# Patient Record
Sex: FEMALE | Race: BLACK OR AFRICAN AMERICAN | NOT HISPANIC OR LATINO | Employment: FULL TIME | URBAN - METROPOLITAN AREA
[De-identification: names, ages, dates, MRNs, and addresses within clinical notes are randomized per-mention and may not be internally consistent; named-entity substitution may affect disease eponyms.]

---

## 2019-12-17 ENCOUNTER — CLINICAL SUPPORT (OUTPATIENT)
Dept: URGENT CARE | Facility: CLINIC | Age: 24
End: 2019-12-17

## 2019-12-17 PROCEDURE — 86580 PR  TB INTRADERMAL TEST: ICD-10-PCS | Mod: S$GLB,,, | Performed by: EMERGENCY MEDICINE

## 2019-12-17 PROCEDURE — 86580 TB INTRADERMAL TEST: CPT | Mod: S$GLB,,, | Performed by: EMERGENCY MEDICINE

## 2020-01-08 ENCOUNTER — HOSPITAL ENCOUNTER (EMERGENCY)
Facility: HOSPITAL | Age: 25
Discharge: HOME | End: 2020-01-08
Attending: EMERGENCY MEDICINE
Payer: MEDICAID

## 2020-01-08 VITALS
SYSTOLIC BLOOD PRESSURE: 155 MMHG | RESPIRATION RATE: 15 BRPM | HEART RATE: 78 BPM | DIASTOLIC BLOOD PRESSURE: 78 MMHG | HEIGHT: 67 IN | BODY MASS INDEX: 29.66 KG/M2 | WEIGHT: 189 LBS | OXYGEN SATURATION: 100 % | TEMPERATURE: 98 F

## 2020-01-08 DIAGNOSIS — K52.9 GASTROENTERITIS: Primary | ICD-10-CM

## 2020-01-08 LAB
B-HCG UR QL: NEGATIVE
BILIRUB UR QL STRIP: NEGATIVE
CLARITY UR: CLEAR
COLOR UR: YELLOW
CTP QC/QA: YES
GLUCOSE UR QL STRIP: NEGATIVE
HGB UR QL STRIP: NEGATIVE
INFLUENZA A, MOLECULAR: NEGATIVE
INFLUENZA B, MOLECULAR: NEGATIVE
KETONES UR QL STRIP: NEGATIVE
LEUKOCYTE ESTERASE UR QL STRIP: NEGATIVE
NITRITE UR QL STRIP: NEGATIVE
PH UR STRIP: 8 [PH] (ref 5–8)
PROT UR QL STRIP: NEGATIVE
SP GR UR STRIP: 1.01 (ref 1–1.03)
SPECIMEN SOURCE: NORMAL
URN SPEC COLLECT METH UR: NORMAL
UROBILINOGEN UR STRIP-ACNC: NEGATIVE EU/DL

## 2020-01-08 PROCEDURE — 87502 INFLUENZA DNA AMP PROBE: CPT

## 2020-01-08 PROCEDURE — 81025 URINE PREGNANCY TEST: CPT | Performed by: EMERGENCY MEDICINE

## 2020-01-08 PROCEDURE — 81003 URINALYSIS AUTO W/O SCOPE: CPT

## 2020-01-08 PROCEDURE — 99282 EMERGENCY DEPT VISIT SF MDM: CPT

## 2020-01-08 NOTE — ED PROVIDER NOTES
Encounter Date: 1/8/2020       History     Chief Complaint   Patient presents with    Abdominal Pain     for 2 days    Diarrhea    Vomiting    Nausea     Patient here with reported abdominal pain onset approximately 2 days ago with associated nausea vomiting on the 1st day and diarrhea yesterday states that her symptoms have largely improved at this time she was concerned that she may still be running fever did not want to go back to work before she knew she would be well she denies any continue nausea and no diarrhea today she does not recall eating anything unusual and denies any sick contacts        Review of patient's allergies indicates:  No Known Allergies  Past Medical History:   Diagnosis Date    ADHD (attention deficit hyperactivity disorder)     Thyroid disease      History reviewed. No pertinent surgical history.  History reviewed. No pertinent family history.  Social History     Tobacco Use    Smoking status: Current Every Day Smoker     Packs/day: 1.00     Years: 1.00     Pack years: 1.00     Types: Cigarettes    Smokeless tobacco: Never Used   Substance Use Topics    Alcohol use: Yes    Drug use: No     Review of Systems   Constitutional: Negative for chills, fatigue and fever.   HENT: Negative.    Eyes: Negative.    Respiratory: Negative.    Cardiovascular: Negative.    Gastrointestinal: Positive for abdominal pain, diarrhea, nausea and vomiting.   Endocrine: Negative.    Genitourinary: Negative.    Musculoskeletal: Negative.    Skin: Negative.    Allergic/Immunologic: Negative.    Neurological: Negative.    Hematological: Negative.    Psychiatric/Behavioral: Negative.        Physical Exam     Initial Vitals [01/08/20 0808]   BP Pulse Resp Temp SpO2   (!) 160/91 90 18 97.5 °F (36.4 °C) 98 %      MAP       --         Physical Exam    Constitutional: She appears well-developed and well-nourished.   HENT:   Head: Normocephalic and atraumatic.   Right Ear: External ear normal.   Left Ear:  External ear normal.   Mouth/Throat: Oropharynx is clear and moist.   Eyes: Conjunctivae and EOM are normal. Pupils are equal, round, and reactive to light.   Neck: Normal range of motion. Neck supple.   Cardiovascular: Normal rate, regular rhythm, normal heart sounds and intact distal pulses.   Pulmonary/Chest: Breath sounds normal. She has no wheezes.   Abdominal: Soft. Bowel sounds are normal. There is no tenderness.   Musculoskeletal: Normal range of motion. She exhibits no edema or tenderness.   Lymphadenopathy:     She has no cervical adenopathy.   Neurological: She is alert and oriented to person, place, and time. She has normal strength. GCS score is 15. GCS eye subscore is 4. GCS verbal subscore is 5. GCS motor subscore is 6.   Skin: Skin is warm and dry. Capillary refill takes less than 2 seconds.   Psychiatric: She has a normal mood and affect. Her behavior is normal.         ED Course   Procedures  Labs Reviewed   URINALYSIS, REFLEX TO URINE CULTURE   INFLUENZA A AND B ANTIGEN   POCT URINE PREGNANCY          Imaging Results    None          Medical Decision Making:   ED Management:  Patient with history of nausea vomiting diarrhea associated abdominal pain influenza negative urinalysis negative patient is tolerating p.o. without difficulty at this time will discharge with outpatient follow-up                                 Clinical Impression:       ICD-10-CM ICD-9-CM   1. Gastroenteritis K52.9 558.9                             Xander Seo MD  01/08/20 1043

## 2020-06-07 ENCOUNTER — HOSPITAL ENCOUNTER (EMERGENCY)
Facility: HOSPITAL | Age: 25
Discharge: HOME | End: 2020-06-07
Attending: EMERGENCY MEDICINE
Payer: MEDICAID

## 2020-06-07 VITALS
SYSTOLIC BLOOD PRESSURE: 148 MMHG | BODY MASS INDEX: 28.56 KG/M2 | OXYGEN SATURATION: 98 % | RESPIRATION RATE: 18 BRPM | HEART RATE: 112 BPM | HEIGHT: 67 IN | TEMPERATURE: 99 F | WEIGHT: 182 LBS | DIASTOLIC BLOOD PRESSURE: 69 MMHG

## 2020-06-07 DIAGNOSIS — M25.562 ACUTE PAIN OF LEFT KNEE: Primary | ICD-10-CM
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
## 2020-06-07 LAB
B-HCG UR QL: NEGATIVE
CTP QC/QA: YES

## 2020-06-07 PROCEDURE — 99283 EMERGENCY DEPT VISIT LOW MDM: CPT | Mod: 25

## 2020-06-07 PROCEDURE — 81025 URINE PREGNANCY TEST: CPT | Performed by: EMERGENCY MEDICINE

## 2020-06-07 RX ORDER — NAPROXEN 375 MG/1
375 TABLET ORAL 2 TIMES DAILY WITH MEALS
Qty: 14 TABLET | Refills: 0 | Status: SHIPPED | OUTPATIENT
Start: 2020-06-07 | End: 2020-10-11 | Stop reason: SDDI

## 2020-06-07 NOTE — DISCHARGE INSTRUCTIONS
Take medications as directed   Naprosyn as directed for pain   Follow up as directed   Return for any concerns

## 2020-06-07 NOTE — ED NOTES
Knee immobilizer was placed.  Went in room with discharge papers and crutches but patient was not present in room.  Called mobile number listed in chart but was told it was the wrong number - called the home number listed in chart, but it is no longer a working number.

## 2020-06-07 NOTE — ED PROVIDER NOTES
Encounter Date: 6/7/2020       History     Chief Complaint   Patient presents with    Knee Pain     left knee     Patient here with complaint of left knee pain states that she slipped and fell landing on her knee.        Review of patient's allergies indicates:  No Known Allergies  Past Medical History:   Diagnosis Date    ADHD (attention deficit hyperactivity disorder)     Thyroid disease      No past surgical history on file.  No family history on file.  Social History     Tobacco Use    Smoking status: Current Every Day Smoker     Packs/day: 1.00     Years: 1.00     Pack years: 1.00     Types: Cigarettes    Smokeless tobacco: Never Used   Substance Use Topics    Alcohol use: Yes    Drug use: No     Review of Systems   Constitutional: Negative.    Musculoskeletal:        Left knee pain   Neurological: Negative.    Hematological: Negative.    Psychiatric/Behavioral: Negative.    All other systems reviewed and are negative.      Physical Exam     Initial Vitals   BP Pulse Resp Temp SpO2   06/07/20 1259 06/07/20 1259 06/07/20 1259 06/07/20 1301 06/07/20 1259   (!) 148/69 (!) 112 18 98.5 °F (36.9 °C) 98 %      MAP       --                Physical Exam    Nursing note and vitals reviewed.  Constitutional: She appears well-developed and well-nourished.   HENT:   Head: Normocephalic and atraumatic.   Musculoskeletal:        Left knee: She exhibits decreased range of motion. She exhibits no effusion, no ecchymosis, no deformity, no laceration, no erythema and normal alignment. Tenderness found.   Skin: Skin is warm and dry.   Psychiatric: She has a normal mood and affect.         ED Course   Splint Application  Date/Time: 6/7/2020 2:11 PM  Performed by: NIEVES Arizmendi  Authorized by: Norberto Knott MD   Location details: left knee  Splint type: long leg  Post-procedure: The splinted body part was neurovascularly unchanged following the procedure.  Patient tolerance: Patient tolerated the procedure well with  no immediate complications  Comments: Knee immobilizer placed to left knee        Labs Reviewed   POCT URINE PREGNANCY          Imaging Results    None                                          Clinical Impression:       ICD-10-CM ICD-9-CM   1. Acute pain of left knee M25.562 719.46   2. Fall W19.XXXA E888.9                                Ashley Busch, [facility]  06/07/20 1418

## 2020-10-11 ENCOUNTER — HOSPITAL ENCOUNTER (EMERGENCY)
Facility: HOSPITAL | Age: 25
Discharge: HOME | End: 2020-10-12
Attending: FAMILY MEDICINE
Payer: MEDICAID

## 2020-10-11 VITALS
RESPIRATION RATE: 16 BRPM | HEART RATE: 98 BPM | HEIGHT: 67 IN | DIASTOLIC BLOOD PRESSURE: 80 MMHG | SYSTOLIC BLOOD PRESSURE: 162 MMHG | TEMPERATURE: 99 F | OXYGEN SATURATION: 98 % | BODY MASS INDEX: 29.66 KG/M2 | WEIGHT: 189 LBS

## 2020-10-11 DIAGNOSIS — R19.7 DIARRHEA, UNSPECIFIED TYPE: ICD-10-CM

## 2020-10-11 DIAGNOSIS — R11.2 NON-INTRACTABLE VOMITING WITH NAUSEA, UNSPECIFIED VOMITING TYPE: Primary | ICD-10-CM

## 2020-10-11 DIAGNOSIS — R10.9 ABDOMINAL CRAMPING: ICD-10-CM

## 2020-10-11 PROCEDURE — 99284 EMERGENCY DEPT VISIT MOD MDM: CPT | Mod: 25

## 2020-10-11 RX ORDER — ONDANSETRON 2 MG/ML
8 INJECTION INTRAMUSCULAR; INTRAVENOUS
Status: COMPLETED | OUTPATIENT
Start: 2020-10-12 | End: 2020-10-12

## 2020-10-11 RX ORDER — HYOSCYAMINE SULFATE 0.12 MG/1
0.12 TABLET SUBLINGUAL
Status: COMPLETED | OUTPATIENT
Start: 2020-10-12 | End: 2020-10-12

## 2020-10-11 RX ORDER — HYOSCYAMINE SULFATE 0.12 MG/1
TABLET SUBLINGUAL
Status: COMPLETED
Start: 2020-10-11 | End: 2020-10-12

## 2020-10-11 NOTE — Clinical Note
"Esther Rowe" Liz was seen and treated in our emergency department on 10/11/2020.  She may return to work on 10/13/2020.       If you have any questions or concerns, please don't hesitate to call.       RN    "

## 2020-10-12 LAB
ALBUMIN SERPL BCP-MCNC: 3.7 G/DL (ref 3.5–5.2)
ALP SERPL-CCNC: 195 U/L (ref 55–135)
ALT SERPL W/O P-5'-P-CCNC: 35 U/L (ref 10–44)
ANION GAP SERPL CALC-SCNC: 10 MMOL/L (ref 8–16)
AST SERPL-CCNC: 38 U/L (ref 10–40)
B-HCG UR QL: NEGATIVE
BASOPHILS # BLD AUTO: 0.02 K/UL (ref 0–0.2)
BASOPHILS NFR BLD: 0.2 % (ref 0–1.9)
BILIRUB SERPL-MCNC: 0.3 MG/DL (ref 0.1–1)
BILIRUB UR QL STRIP: NEGATIVE
BUN SERPL-MCNC: 6 MG/DL (ref 6–20)
CALCIUM SERPL-MCNC: 9.2 MG/DL (ref 8.7–10.5)
CHLORIDE SERPL-SCNC: 109 MMOL/L (ref 95–110)
CLARITY UR: CLEAR
CO2 SERPL-SCNC: 23 MMOL/L (ref 23–29)
COLOR UR: YELLOW
CREAT SERPL-MCNC: <0.3 MG/DL (ref 0.5–1.4)
DIFFERENTIAL METHOD: ABNORMAL
EOSINOPHIL # BLD AUTO: 0.3 K/UL (ref 0–0.5)
EOSINOPHIL NFR BLD: 3.4 % (ref 0–8)
ERYTHROCYTE [DISTWIDTH] IN BLOOD BY AUTOMATED COUNT: 13.5 % (ref 11.5–14.5)
EST. GFR  (AFRICAN AMERICAN): >60 ML/MIN/1.73 M^2
EST. GFR  (NON AFRICAN AMERICAN): >60 ML/MIN/1.73 M^2
GLUCOSE SERPL-MCNC: 123 MG/DL (ref 70–110)
GLUCOSE UR QL STRIP: NEGATIVE
HCT VFR BLD AUTO: 37.3 % (ref 37–48.5)
HGB BLD-MCNC: 12 G/DL (ref 12–16)
HGB UR QL STRIP: NEGATIVE
IMM GRANULOCYTES # BLD AUTO: 0.02 K/UL (ref 0–0.04)
IMM GRANULOCYTES NFR BLD AUTO: 0.2 % (ref 0–0.5)
KETONES UR QL STRIP: NEGATIVE
LEUKOCYTE ESTERASE UR QL STRIP: NEGATIVE
LIPASE SERPL-CCNC: 17 U/L (ref 4–60)
LYMPHOCYTES # BLD AUTO: 3.8 K/UL (ref 1–4.8)
LYMPHOCYTES NFR BLD: 39.5 % (ref 18–48)
MAGNESIUM SERPL-MCNC: 1.8 MG/DL (ref 1.6–2.6)
MCH RBC QN AUTO: 24.6 PG (ref 27–31)
MCHC RBC AUTO-ENTMCNC: 32.2 G/DL (ref 32–36)
MCV RBC AUTO: 77 FL (ref 82–98)
MONOCYTES # BLD AUTO: 0.8 K/UL (ref 0.3–1)
MONOCYTES NFR BLD: 7.8 % (ref 4–15)
NEUTROPHILS # BLD AUTO: 4.7 K/UL (ref 1.8–7.7)
NEUTROPHILS NFR BLD: 48.9 % (ref 38–73)
NITRITE UR QL STRIP: NEGATIVE
NRBC BLD-RTO: 0 /100 WBC
PH UR STRIP: 6 [PH] (ref 5–8)
PLATELET # BLD AUTO: 291 K/UL (ref 150–350)
PMV BLD AUTO: 10.1 FL (ref 9.2–12.9)
POTASSIUM SERPL-SCNC: 3.6 MMOL/L (ref 3.5–5.1)
PROT SERPL-MCNC: 7.8 G/DL (ref 6–8.4)
PROT UR QL STRIP: NEGATIVE
RBC # BLD AUTO: 4.87 M/UL (ref 4–5.4)
SODIUM SERPL-SCNC: 142 MMOL/L (ref 136–145)
SP GR UR STRIP: >=1.03 (ref 1–1.03)
URN SPEC COLLECT METH UR: ABNORMAL
UROBILINOGEN UR STRIP-ACNC: NEGATIVE EU/DL
WBC # BLD AUTO: 9.64 K/UL (ref 3.9–12.7)

## 2020-10-12 PROCEDURE — 25000003 PHARM REV CODE 250

## 2020-10-12 PROCEDURE — 81003 URINALYSIS AUTO W/O SCOPE: CPT

## 2020-10-12 PROCEDURE — 63600175 PHARM REV CODE 636 W HCPCS: Performed by: FAMILY MEDICINE

## 2020-10-12 PROCEDURE — 83690 ASSAY OF LIPASE: CPT

## 2020-10-12 PROCEDURE — 96361 HYDRATE IV INFUSION ADD-ON: CPT

## 2020-10-12 PROCEDURE — 81025 URINE PREGNANCY TEST: CPT

## 2020-10-12 PROCEDURE — 96374 THER/PROPH/DIAG INJ IV PUSH: CPT

## 2020-10-12 PROCEDURE — 85025 COMPLETE CBC W/AUTO DIFF WBC: CPT

## 2020-10-12 PROCEDURE — 83735 ASSAY OF MAGNESIUM: CPT

## 2020-10-12 PROCEDURE — 80053 COMPREHEN METABOLIC PANEL: CPT

## 2020-10-12 PROCEDURE — 25000003 PHARM REV CODE 250: Performed by: FAMILY MEDICINE

## 2020-10-12 RX ORDER — ONDANSETRON 4 MG/1
4 TABLET, ORALLY DISINTEGRATING ORAL EVERY 6 HOURS PRN
Qty: 20 TABLET | Refills: 0 | Status: SHIPPED | OUTPATIENT
Start: 2020-10-12 | End: 2020-11-07 | Stop reason: CLARIF

## 2020-10-12 RX ORDER — HYOSCYAMINE SULFATE 0.125 MG
125 TABLET ORAL EVERY 6 HOURS PRN
Qty: 20 TABLET | Refills: 0 | Status: SHIPPED | OUTPATIENT
Start: 2020-10-12 | End: 2020-11-07 | Stop reason: CLARIF

## 2020-10-12 RX ADMIN — ONDANSETRON 8 MG: 2 INJECTION INTRAMUSCULAR; INTRAVENOUS at 12:10

## 2020-10-12 RX ADMIN — HYOSCYAMINE SULFATE 0.12 MG: 0.12 TABLET SUBLINGUAL at 12:10

## 2020-10-12 RX ADMIN — SODIUM CHLORIDE 1000 ML: 0.9 INJECTION, SOLUTION INTRAVENOUS at 12:10

## 2020-10-12 RX ADMIN — HYOSCYAMINE SULFATE 0.12 MG: 0.12 TABLET ORAL; SUBLINGUAL at 12:10

## 2020-10-12 NOTE — ED PROVIDER NOTES
Encounter Date: 10/11/2020       History     Chief Complaint   Patient presents with    Abdominal Pain    Nausea    Vomiting    Diarrhea     Patient comes our facility complaining of generalized abdominal cramping as well as nausea, vomiting, diarrhea.  Patient states that she started having nausea with emesis last night.  She has had 2 bouts of emesis today.  She describes chronic diarrhea that she attributes to her thyroid disorder.  Patient denies any fevers, chills, contact with any sick individuals.  Patient denies any consumption of any suspicious food.  No NG nose has similar symptoms.  Patient states that her abdomen feels kind of queasy and a little crampy been no sharp or dull or aching type pains.  Last bowel movement was today and was loose.        Review of patient's allergies indicates:  No Known Allergies  Past Medical History:   Diagnosis Date    ADHD (attention deficit hyperactivity disorder)     Thyroid disease      No past surgical history on file.  No family history on file.  Social History     Tobacco Use    Smoking status: Current Every Day Smoker     Packs/day: 1.00     Years: 1.00     Pack years: 1.00     Types: Cigarettes    Smokeless tobacco: Never Used   Substance Use Topics    Alcohol use: Yes    Drug use: No     Review of Systems   Constitutional: Negative for chills, fatigue and fever.   HENT: Negative for sore throat.    Respiratory: Negative for cough, shortness of breath and wheezing.    Cardiovascular: Negative for chest pain, palpitations and leg swelling.   Gastrointestinal: Positive for diarrhea, nausea and vomiting. Negative for abdominal distention, abdominal pain and constipation.   Genitourinary: Negative for dysuria and flank pain.   Musculoskeletal: Negative for back pain and myalgias.   Neurological: Negative for dizziness, weakness, light-headedness, numbness and headaches.   Hematological: Negative for adenopathy. Does not bruise/bleed easily.    Psychiatric/Behavioral: Negative for agitation, behavioral problems and confusion.       Physical Exam     Initial Vitals [10/11/20 2317]   BP Pulse Resp Temp SpO2   (!) 162/80 98 16 98.6 °F (37 °C) 98 %      MAP       --         Physical Exam    Nursing note and vitals reviewed.  Constitutional: She appears well-developed and well-nourished. She is not diaphoretic. No distress.   HENT:   Head: Normocephalic and atraumatic.   Nose: Nose normal.   Mouth/Throat: No oropharyngeal exudate.   Eyes: Conjunctivae and EOM are normal. Right eye exhibits no discharge. Left eye exhibits no discharge. No scleral icterus.   Neck: Normal range of motion. Neck supple. No thyromegaly present.   Cardiovascular: Normal rate, regular rhythm, normal heart sounds and intact distal pulses.   No murmur heard.  Pulmonary/Chest: Breath sounds normal. No respiratory distress. She has no wheezes. She has no rhonchi. She has no rales. She exhibits no tenderness.   Abdominal: Soft. Bowel sounds are normal. She exhibits no distension and no mass. There is no abdominal tenderness. There is no rebound and no guarding.   Musculoskeletal: Normal range of motion. No tenderness.   Lymphadenopathy:     She has no cervical adenopathy.   Neurological: She is alert and oriented to person, place, and time. She has normal strength. GCS score is 15. GCS eye subscore is 4. GCS verbal subscore is 5. GCS motor subscore is 6.   Skin: Skin is warm and dry. Capillary refill takes less than 2 seconds. No rash and no abscess noted. No erythema. No pallor.   Psychiatric: She has a normal mood and affect. Her behavior is normal. Judgment and thought content normal.         ED Course   Procedures  Labs Reviewed - No data to display     CBC, CMP, lipase, UA all WNL, but urine was concentrated.    Patient has nausea and abdominal cramping resolved with treatment.  Imaging Results    None                                      Clinical Impression:      1- nausea with  emesis    2- diarrhea    3- abdominal cramping      Disposition:   Disposition: Discharged  Condition: Stable                          Luke Perkins MD  10/12/20 0133

## 2020-10-12 NOTE — ED TRIAGE NOTES
Pt reports generalized abd pain with nvd since yesterday night.  Stated she has accompanying malaise.  Denies dyruria.

## 2020-11-07 ENCOUNTER — HOSPITAL ENCOUNTER (EMERGENCY)
Facility: HOSPITAL | Age: 25
Discharge: HOME | End: 2020-11-07
Attending: FAMILY MEDICINE
Payer: MEDICAID

## 2020-11-07 VITALS
TEMPERATURE: 98 F | BODY MASS INDEX: 28.56 KG/M2 | DIASTOLIC BLOOD PRESSURE: 87 MMHG | HEIGHT: 67 IN | SYSTOLIC BLOOD PRESSURE: 130 MMHG | RESPIRATION RATE: 18 BRPM | HEART RATE: 103 BPM | OXYGEN SATURATION: 99 % | WEIGHT: 182 LBS

## 2020-11-07 DIAGNOSIS — R10.13 EPIGASTRIC PAIN: Primary | ICD-10-CM

## 2020-11-07 LAB
B-HCG UR QL: NEGATIVE
BILIRUB UR QL STRIP: NEGATIVE
CLARITY UR: CLEAR
COLOR UR: YELLOW
GLUCOSE UR QL STRIP: ABNORMAL
HGB UR QL STRIP: ABNORMAL
KETONES UR QL STRIP: NEGATIVE
LEUKOCYTE ESTERASE UR QL STRIP: NEGATIVE
NITRITE UR QL STRIP: NEGATIVE
PH UR STRIP: 6 [PH] (ref 5–8)
PROT UR QL STRIP: NEGATIVE
SP GR UR STRIP: >=1.03 (ref 1–1.03)
URN SPEC COLLECT METH UR: ABNORMAL
UROBILINOGEN UR STRIP-ACNC: NEGATIVE EU/DL

## 2020-11-07 PROCEDURE — 81003 URINALYSIS AUTO W/O SCOPE: CPT

## 2020-11-07 PROCEDURE — 99282 EMERGENCY DEPT VISIT SF MDM: CPT

## 2020-11-07 PROCEDURE — 81025 URINE PREGNANCY TEST: CPT

## 2020-11-07 NOTE — Clinical Note
"Esther"Karol Hill was seen and treated in our emergency department on 11/7/2020.  She may return to work on 11/08/2020.       If you have any questions or concerns, please don't hesitate to call.      Esequiel Phillips MD"

## 2020-11-07 NOTE — ED PROVIDER NOTES
Encounter Date: 11/7/2020       History     Chief Complaint   Patient presents with    Abdominal Pain     24-year-old female presents with epigastric discomfort I think it might be heartburn patient was at her job working in a [employer] I have been working 3 long shifts in a row, patient also states that some time she eats the food there and feels like it may upset her stomach she has no known history of gallbladder disease peptic ulcer disease she denies possibility of being pregnant she has a history of hyperthyroidism        Review of patient's allergies indicates:  No Known Allergies  Past Medical History:   Diagnosis Date    ADHD (attention deficit hyperactivity disorder)     Thyroid disease      History reviewed. No pertinent surgical history.  History reviewed. No pertinent family history.  Social History     Tobacco Use    Smoking status: Former Smoker    Smokeless tobacco: Never Used   Substance Use Topics    Alcohol use: Yes     Alcohol/week: 7.0 standard drinks     Types: 7 Standard drinks or equivalent per week     Comment: daily drinker    Drug use: No     Review of Systems   Constitutional: Negative for fever.   HENT: Negative for sore throat.    Respiratory: Negative for shortness of breath.    Cardiovascular: Negative for chest pain.   Gastrointestinal: Positive for abdominal pain. Negative for nausea.   Genitourinary: Negative for dysuria.   Musculoskeletal: Negative for back pain.   Skin: Negative for rash.   Neurological: Negative for weakness.   Hematological: Does not bruise/bleed easily.       Physical Exam     Initial Vitals [11/07/20 0412]   BP Pulse Resp Temp SpO2   130/87 103 18 98.4 °F (36.9 °C) 99 %      MAP       --         Physical Exam    Nursing note and vitals reviewed.  Constitutional: She appears well-developed and well-nourished. She is not diaphoretic. No distress.   HENT:   Head: Normocephalic and atraumatic.   Right Ear: External ear normal.   Left Ear: External  ear normal.   Eyes: Pupils are equal, round, and reactive to light. Right eye exhibits no discharge. Left eye exhibits no discharge.   Neck: No tracheal deviation present. No JVD present.   Cardiovascular: Exam reveals no friction rub.    No murmur heard.  Pulmonary/Chest: No stridor. No respiratory distress. She has no wheezes. She has no rales.   Abdominal: Bowel sounds are normal. She exhibits no distension.   Musculoskeletal: Normal range of motion.   Neurological: She is alert.   Skin: Skin is warm.   Psychiatric: She has a normal mood and affect.         ED Course   Procedures  Labs Reviewed   URINALYSIS, REFLEX TO URINE CULTURE - Abnormal; Notable for the following components:       Result Value    Specific Gravity, UA >=1.030 (*)     Glucose, UA Trace (*)     Occult Blood UA Trace (*)     All other components within normal limits    Narrative:     Specimen Source->Urine   PREGNANCY TEST, URINE RAPID    Narrative:     Specimen Source->Urine          Imaging Results    None                                      Clinical Impression:       ICD-10-CM ICD-9-CM   1. Epigastric pain  R10.13 789.06                          ED Disposition Condition    Discharge Stable        ED Prescriptions     None        Follow-up Information    None                                      Esequiel Phillips MD  11/07/20 0526

## 2021-01-29 ENCOUNTER — HOSPITAL ENCOUNTER (EMERGENCY)
Facility: HOSPITAL | Age: 26
Discharge: LEFT BEFORE DISCHARGE | End: 2021-01-29
Attending: FAMILY MEDICINE
Payer: MEDICAID

## 2021-01-29 VITALS
WEIGHT: 189 LBS | RESPIRATION RATE: 20 BRPM | OXYGEN SATURATION: 98 % | BODY MASS INDEX: 29.66 KG/M2 | HEART RATE: 104 BPM | DIASTOLIC BLOOD PRESSURE: 81 MMHG | TEMPERATURE: 98 F | SYSTOLIC BLOOD PRESSURE: 151 MMHG | HEIGHT: 67 IN

## 2021-01-29 DIAGNOSIS — N93.9 VAGINAL BLEEDING: Primary | ICD-10-CM

## 2021-01-29 LAB
B-HCG UR QL: NEGATIVE
BILIRUB UR QL STRIP: NEGATIVE
CLARITY UR: CLEAR
COLOR UR: YELLOW
GLUCOSE UR QL STRIP: NEGATIVE
HGB UR QL STRIP: NEGATIVE
KETONES UR QL STRIP: ABNORMAL
LEUKOCYTE ESTERASE UR QL STRIP: NEGATIVE
NITRITE UR QL STRIP: NEGATIVE
PH UR STRIP: 7 [PH] (ref 5–8)
PROT UR QL STRIP: NEGATIVE
SP GR UR STRIP: 1.02 (ref 1–1.03)
URN SPEC COLLECT METH UR: ABNORMAL
UROBILINOGEN UR STRIP-ACNC: 1 EU/DL

## 2021-01-29 PROCEDURE — 81003 URINALYSIS AUTO W/O SCOPE: CPT

## 2021-01-29 PROCEDURE — 81025 URINE PREGNANCY TEST: CPT

## 2021-01-29 PROCEDURE — 99282 EMERGENCY DEPT VISIT SF MDM: CPT

## 2021-06-26 ENCOUNTER — HOSPITAL ENCOUNTER (EMERGENCY)
Facility: HOSPITAL | Age: 26
Discharge: HOME | End: 2021-06-26
Attending: FAMILY MEDICINE
Payer: MEDICAID

## 2021-06-26 VITALS
HEIGHT: 67 IN | OXYGEN SATURATION: 99 % | HEART RATE: 103 BPM | SYSTOLIC BLOOD PRESSURE: 174 MMHG | DIASTOLIC BLOOD PRESSURE: 100 MMHG | TEMPERATURE: 100 F | WEIGHT: 189 LBS | BODY MASS INDEX: 29.66 KG/M2 | RESPIRATION RATE: 20 BRPM

## 2021-06-26 DIAGNOSIS — J40 BRONCHITIS: Primary | ICD-10-CM

## 2021-06-26 DIAGNOSIS — Z86.39 HISTORY OF HYPERTHYROIDISM: ICD-10-CM

## 2021-06-26 DIAGNOSIS — R05.9 COUGH: ICD-10-CM

## 2021-06-26 LAB
INFLUENZA A, MOLECULAR: NEGATIVE
INFLUENZA B, MOLECULAR: NEGATIVE
SARS-COV-2 RDRP RESP QL NAA+PROBE: NEGATIVE
SPECIMEN SOURCE: NORMAL

## 2021-06-26 PROCEDURE — 71046 X-RAY EXAM CHEST 2 VIEWS: CPT | Mod: TC,FY

## 2021-06-26 PROCEDURE — 99284 EMERGENCY DEPT VISIT MOD MDM: CPT | Mod: 25

## 2021-06-26 PROCEDURE — 25000242 PHARM REV CODE 250 ALT 637 W/ HCPCS: Performed by: PHYSICIAN ASSISTANT

## 2021-06-26 PROCEDURE — 94640 AIRWAY INHALATION TREATMENT: CPT

## 2021-06-26 PROCEDURE — 71046 XR CHEST PA AND LATERAL: ICD-10-PCS | Mod: 26,,, | Performed by: RADIOLOGY

## 2021-06-26 PROCEDURE — U0002 COVID-19 LAB TEST NON-CDC: HCPCS | Performed by: PHYSICIAN ASSISTANT

## 2021-06-26 PROCEDURE — 87502 INFLUENZA DNA AMP PROBE: CPT | Performed by: PHYSICIAN ASSISTANT

## 2021-06-26 PROCEDURE — 71046 X-RAY EXAM CHEST 2 VIEWS: CPT | Mod: 26,,, | Performed by: RADIOLOGY

## 2021-06-26 RX ORDER — DICYCLOMINE HYDROCHLORIDE 20 MG/1
20 TABLET ORAL 2 TIMES DAILY PRN
Qty: 20 TABLET | Refills: 0 | Status: SHIPPED | OUTPATIENT
Start: 2021-06-26 | End: 2021-06-26 | Stop reason: SDUPTHER

## 2021-06-26 RX ORDER — BENZONATATE 100 MG/1
100 CAPSULE ORAL 3 TIMES DAILY PRN
Qty: 20 CAPSULE | Refills: 0 | Status: SHIPPED | OUTPATIENT
Start: 2021-06-26 | End: 2021-06-26 | Stop reason: SDUPTHER

## 2021-06-26 RX ORDER — ONDANSETRON 4 MG/1
4 TABLET, FILM COATED ORAL EVERY 6 HOURS PRN
Qty: 12 TABLET | Refills: 0 | Status: SHIPPED | OUTPATIENT
Start: 2021-06-26

## 2021-06-26 RX ORDER — IPRATROPIUM BROMIDE AND ALBUTEROL SULFATE 2.5; .5 MG/3ML; MG/3ML
3 SOLUTION RESPIRATORY (INHALATION)
Status: COMPLETED | OUTPATIENT
Start: 2021-06-26 | End: 2021-06-26

## 2021-06-26 RX ORDER — ALBUTEROL SULFATE 90 UG/1
1-2 AEROSOL, METERED RESPIRATORY (INHALATION) EVERY 6 HOURS PRN
Qty: 6.7 G | Refills: 0 | Status: SHIPPED | OUTPATIENT
Start: 2021-06-26 | End: 2021-06-26 | Stop reason: SDUPTHER

## 2021-06-26 RX ORDER — BENZONATATE 100 MG/1
100 CAPSULE ORAL 3 TIMES DAILY PRN
Qty: 20 CAPSULE | Refills: 0 | Status: SHIPPED | OUTPATIENT
Start: 2021-06-26 | End: 2021-07-06

## 2021-06-26 RX ORDER — ONDANSETRON 4 MG/1
4 TABLET, FILM COATED ORAL EVERY 6 HOURS PRN
Qty: 12 TABLET | Refills: 0 | Status: SHIPPED | OUTPATIENT
Start: 2021-06-26 | End: 2021-06-26 | Stop reason: SDUPTHER

## 2021-06-26 RX ORDER — ALBUTEROL SULFATE 90 UG/1
1-2 AEROSOL, METERED RESPIRATORY (INHALATION) EVERY 6 HOURS PRN
Qty: 6.7 G | Refills: 0 | Status: SHIPPED | OUTPATIENT
Start: 2021-06-26 | End: 2022-06-26

## 2021-06-26 RX ORDER — DICYCLOMINE HYDROCHLORIDE 20 MG/1
20 TABLET ORAL 2 TIMES DAILY PRN
Qty: 20 TABLET | Refills: 0 | Status: SHIPPED | OUTPATIENT
Start: 2021-06-26 | End: 2021-07-26

## 2021-06-26 RX ADMIN — IPRATROPIUM BROMIDE AND ALBUTEROL SULFATE 3 ML: .5; 3 SOLUTION RESPIRATORY (INHALATION) at 11:06

## 2021-11-16 ENCOUNTER — HOSPITAL ENCOUNTER (EMERGENCY)
Facility: HOSPITAL | Age: 26
Discharge: HOME | End: 2021-11-16
Payer: MEDICAID

## 2021-11-16 VITALS
BODY MASS INDEX: 25.71 KG/M2 | TEMPERATURE: 99 F | HEIGHT: 66 IN | SYSTOLIC BLOOD PRESSURE: 180 MMHG | HEART RATE: 107 BPM | DIASTOLIC BLOOD PRESSURE: 109 MMHG | WEIGHT: 160 LBS | OXYGEN SATURATION: 98 % | RESPIRATION RATE: 18 BRPM

## 2021-11-16 DIAGNOSIS — J40 BRONCHITIS: Primary | ICD-10-CM

## 2021-11-16 LAB
GROUP A STREP, MOLECULAR: NEGATIVE
SARS-COV-2 RDRP RESP QL NAA+PROBE: NEGATIVE

## 2021-11-16 PROCEDURE — U0002 COVID-19 LAB TEST NON-CDC: HCPCS | Performed by: FAMILY MEDICINE

## 2021-11-16 PROCEDURE — 87651 STREP A DNA AMP PROBE: CPT | Performed by: FAMILY MEDICINE

## 2021-11-16 PROCEDURE — 99283 EMERGENCY DEPT VISIT LOW MDM: CPT | Mod: 25

## 2021-11-16 RX ORDER — ALBUTEROL SULFATE 90 UG/1
1-2 AEROSOL, METERED RESPIRATORY (INHALATION) EVERY 6 HOURS PRN
Qty: 1 G | Refills: 1 | Status: SHIPPED | OUTPATIENT
Start: 2021-11-16

## 2022-01-10 ENCOUNTER — HOSPITAL ENCOUNTER (EMERGENCY)
Facility: HOSPITAL | Age: 27
Discharge: HOME | End: 2022-01-11
Attending: EMERGENCY MEDICINE
Payer: MEDICAID

## 2022-01-10 VITALS
BODY MASS INDEX: 28.25 KG/M2 | TEMPERATURE: 99 F | DIASTOLIC BLOOD PRESSURE: 91 MMHG | RESPIRATION RATE: 18 BRPM | OXYGEN SATURATION: 99 % | HEART RATE: 115 BPM | HEIGHT: 67 IN | WEIGHT: 180 LBS | SYSTOLIC BLOOD PRESSURE: 156 MMHG

## 2022-01-10 DIAGNOSIS — B34.9 VIRAL SYNDROME: Primary | ICD-10-CM

## 2022-01-10 DIAGNOSIS — E04.9 GOITER: ICD-10-CM

## 2022-01-10 DIAGNOSIS — R01.1 HEART MURMUR: ICD-10-CM

## 2022-01-10 PROCEDURE — U0002 COVID-19 LAB TEST NON-CDC: HCPCS | Performed by: EMERGENCY MEDICINE

## 2022-01-10 PROCEDURE — 87502 INFLUENZA DNA AMP PROBE: CPT | Performed by: EMERGENCY MEDICINE

## 2022-01-10 PROCEDURE — 99282 EMERGENCY DEPT VISIT SF MDM: CPT | Mod: 25

## 2022-01-11 NOTE — ED TRIAGE NOTES
Patient presents to ER with c/o covid concerns. Pt reports cough, scratchy throat, headache and intermittent fever/chills that started about 1 week ago. Patient reports exposure to covid.

## 2022-01-11 NOTE — ED PROVIDER NOTES
Encounter Date: 1/10/2022       History     Chief Complaint   Patient presents with    COVID-19 Concerns     Pt reports covid symptoms that started about 1 week ago     26-year-old female here with concerns about COVID-19.  She states that for the past week she has had a nonproductive cough, scratchy throat, generalized headache, and intermittent fevers and chills.  Last fever was about 24 hours ago.  She has been quarantine herself at home.  She is unsure about any COVID contacts.  She has been using over-the-counter medications with moderate relief of symptoms.        Review of patient's allergies indicates:  No Known Allergies  Past Medical History:   Diagnosis Date    ADHD (attention deficit hyperactivity disorder)     Thyroid disease      History reviewed. No pertinent surgical history.  History reviewed. No pertinent family history.  Social History     Tobacco Use    Smoking status: Current Every Day Smoker     Types: Cigarettes    Smokeless tobacco: Never Used   Substance Use Topics    Alcohol use: Yes     Alcohol/week: 7.0 standard drinks     Types: 7 Standard drinks or equivalent per week     Comment: daily drinker    Drug use: Yes     Types: Marijuana     Review of Systems   Constitutional: Positive for chills, fatigue and fever.   HENT: Positive for congestion, rhinorrhea and sore throat. Negative for ear pain.    Eyes: Negative for photophobia and pain.   Respiratory: Negative for cough, chest tightness and shortness of breath.    Cardiovascular: Negative for chest pain and palpitations.   Gastrointestinal: Negative for abdominal pain, constipation, diarrhea, nausea and vomiting.   Endocrine: Negative for polydipsia and polyuria.   Genitourinary: Negative for dysuria, flank pain, frequency, hematuria and urgency.   Musculoskeletal: Negative for arthralgias, myalgias, neck pain and neck stiffness.   Skin: Negative for pallor and rash.   Neurological: Positive for weakness and headaches. Negative  for dizziness, syncope and numbness.   Psychiatric/Behavioral: The patient is not nervous/anxious.        Physical Exam     Initial Vitals [01/10/22 2311]   BP Pulse Resp Temp SpO2   (!) 156/91 (!) 115 18 98.6 °F (37 °C) 99 %      MAP       --         Physical Exam    Nursing note and vitals reviewed.  Constitutional: She appears well-developed and well-nourished. She is not diaphoretic. No distress.   HENT:   Head: Normocephalic and atraumatic.   Right Ear: External ear normal.   Left Ear: External ear normal.   Nose: Nose normal.   Mouth/Throat: Oropharynx is clear and moist. No oropharyngeal exudate.   Eyes: Conjunctivae and EOM are normal. Pupils are equal, round, and reactive to light. No scleral icterus.   Neck: Neck supple. Thyromegaly (Large symmetric goiter) present. No tracheal deviation present. No JVD present.   Normal range of motion.  Cardiovascular: Normal rate, regular rhythm and intact distal pulses.   Murmur heard.  Pulmonary/Chest: Breath sounds normal. No stridor. No respiratory distress. She has no wheezes. She has no rhonchi. She has no rales.   Abdominal: Abdomen is soft. Bowel sounds are normal. She exhibits no distension. There is no abdominal tenderness.   Musculoskeletal:         General: No tenderness or edema. Normal range of motion.      Cervical back: Normal range of motion and neck supple.     Neurological: She is oriented to person, place, and time. She has normal strength and normal reflexes. No cranial nerve deficit or sensory deficit. GCS score is 15. GCS eye subscore is 4. GCS verbal subscore is 5. GCS motor subscore is 6.   Skin: Skin is warm and dry. Capillary refill takes less than 2 seconds. No rash noted. No erythema.   Psychiatric: She has a normal mood and affect. Her behavior is normal.         ED Course   Procedures  Labs Reviewed   INFLUENZA A & B BY MOLECULAR   SARS-COV-2 RNA AMPLIFICATION, QUAL    Narrative:     Is the patient symptomatic?->Yes          Imaging  Results    None          Medications - No data to display  Medical Decision Making:   Differential Diagnosis:   COVID-19, influenza, other viral illness, seasonal allergies, etc.  ED Management:  Patient has tested negative for COVID, negative for influenza.  Symptoms likely viral, but false negative is possible.  Patient was told to continue quarantine until she has been free of fever for 72 hours.  Also recommended follow-up with PCP for her goiter.  She states she has had this as long as she could remember, basically all my life.  She has never been treated for it.  Will refer to Family Practice.  Patient will continue over-the-counter medications for her symptoms.  Return here as needed or if worse in any way.                      Clinical Impression:   Final diagnoses:  [B34.9] Viral syndrome (Primary)  [E04.9] Goiter  [R01.1] Heart murmur          ED Disposition Condition    Discharge Stable        ED Prescriptions     None        Follow-up Information    None          Joseph Quinones MD  01/11/22 0003

## 2022-01-11 NOTE — DISCHARGE INSTRUCTIONS
Because the COVID-19 test is not 100% accurate, it is still possible that she have had COVID, but the test did not pick it up.  For this reason you should continue to quarantine until you have been without fever for 3 days.  You need to follow-up with primary care provider about her current symptoms, your heart murmur, and your goiter, or enlarged thyroid.  You have been referred to family practice.  You should receive a phone call within the next 7-10 days regarding an appointment.  Return here as needed or if worse in any way.

## 2022-03-01 ENCOUNTER — HOSPITAL ENCOUNTER (EMERGENCY)
Facility: HOSPITAL | Age: 27
Discharge: HOME | End: 2022-03-02
Attending: EMERGENCY MEDICINE
Payer: MEDICAID

## 2022-03-01 VITALS
OXYGEN SATURATION: 98 % | HEIGHT: 67 IN | BODY MASS INDEX: 28.25 KG/M2 | TEMPERATURE: 98 F | WEIGHT: 180 LBS | SYSTOLIC BLOOD PRESSURE: 170 MMHG | HEART RATE: 98 BPM | DIASTOLIC BLOOD PRESSURE: 93 MMHG | RESPIRATION RATE: 18 BRPM

## 2022-03-01 DIAGNOSIS — R01.1 HEART MURMUR: ICD-10-CM

## 2022-03-01 DIAGNOSIS — A08.4 VIRAL GASTROENTERITIS: Primary | ICD-10-CM

## 2022-03-01 DIAGNOSIS — Z32.02 NEGATIVE PREGNANCY TEST: ICD-10-CM

## 2022-03-01 PROCEDURE — 81003 URINALYSIS AUTO W/O SCOPE: CPT | Performed by: EMERGENCY MEDICINE

## 2022-03-01 PROCEDURE — 99283 EMERGENCY DEPT VISIT LOW MDM: CPT | Mod: 25

## 2022-03-01 PROCEDURE — 81025 URINE PREGNANCY TEST: CPT | Performed by: EMERGENCY MEDICINE

## 2022-03-01 NOTE — Clinical Note
"Esther Almonteie" Liz was seen and treated in our emergency department on 3/1/2022.  She may return with no restrictions on 03/02/2022.       Sincerely,      Joseph Quinones MD    "

## 2022-03-01 NOTE — Clinical Note
"Esther Almonteie" Liz was seen and treated in our emergency department on 3/1/2022.  She may return with no restrictions on 03/02/2022.       Sincerely,      Jsoeph Quinones MD    "

## 2022-03-02 LAB
B-HCG UR QL: NEGATIVE
BILIRUB UR QL STRIP: NEGATIVE
CLARITY UR: CLEAR
COLOR UR: YELLOW
GLUCOSE UR QL STRIP: ABNORMAL
HGB UR QL STRIP: NEGATIVE
KETONES UR QL STRIP: NEGATIVE
LEUKOCYTE ESTERASE UR QL STRIP: NEGATIVE
NITRITE UR QL STRIP: NEGATIVE
PH UR STRIP: 7 [PH] (ref 5–8)
PROT UR QL STRIP: NEGATIVE
SP GR UR STRIP: 1.01 (ref 1–1.03)
URN SPEC COLLECT METH UR: ABNORMAL
UROBILINOGEN UR STRIP-ACNC: NEGATIVE EU/DL

## 2022-03-02 NOTE — ED TRIAGE NOTES
Pt to ED with c/o constipation. Pt states 1 BM in last 4 days. Pt is also requesting pregnancy test.

## 2022-03-02 NOTE — ED PROVIDER NOTES
Encounter Date: 3/1/2022       History     Chief Complaint   Patient presents with    Constipation     Pt reports had 1 BM today (1st in 4 days), also request pregnancy test     26-year-old female here complaining of what she describes as constipation.  She states that for about 3 days, she states she could not have a bowel movement.  She did not take any medications or treatments for this, but yesterday she had a large bowel movement, which she states was in fact somewhat loose.  She states that she has had some nausea as well, and she vomited around the same time that she had her bowel movement.  She states that now she feels fine.  She did not have any fever or chills.  denies any dysuria.  Patient was concerned she might be pregnant.  No sick contacts, no suspicious food intake.  Denies any history of constipation in the past.        Review of patient's allergies indicates:  No Known Allergies  Past Medical History:   Diagnosis Date    ADHD (attention deficit hyperactivity disorder)     Thyroid disease      History reviewed. No pertinent surgical history.  History reviewed. No pertinent family history.  Social History     Tobacco Use    Smoking status: Current Every Day Smoker     Types: Cigarettes    Smokeless tobacco: Never Used   Substance Use Topics    Alcohol use: Yes     Alcohol/week: 7.0 standard drinks     Types: 7 Standard drinks or equivalent per week     Comment: daily drinker    Drug use: Yes     Types: Marijuana     Review of Systems   HENT: Negative.    Eyes: Negative.    Cardiovascular: Negative.    Gastrointestinal: Positive for constipation, diarrhea, nausea and vomiting.   Genitourinary: Negative.    Musculoskeletal: Negative.    Skin: Negative.    Neurological: Negative.    Psychiatric/Behavioral: Negative.        Physical Exam     Initial Vitals [03/01/22 2346]   BP Pulse Resp Temp SpO2   (!) 170/93 98 18 98.2 °F (36.8 °C) 98 %      MAP       --         Physical Exam    Nursing note  and vitals reviewed.  Constitutional: She appears well-developed and well-nourished. She is not diaphoretic. No distress.   HENT:   Head: Normocephalic and atraumatic.   Nose: Nose normal.   Mouth/Throat: Oropharynx is clear and moist. No oropharyngeal exudate.   Eyes: Conjunctivae and EOM are normal. Pupils are equal, round, and reactive to light. No scleral icterus.   Neck: Neck supple. No JVD present.   Normal range of motion.  Cardiovascular: Normal rate, regular rhythm and intact distal pulses.   Murmur heard.  Pulmonary/Chest: Breath sounds normal. No stridor. No respiratory distress. She has no wheezes. She has no rales.   Abdominal: Abdomen is soft. Bowel sounds are normal. She exhibits no distension and no mass. There is no abdominal tenderness. There is no rebound and no guarding.   Musculoskeletal:         General: No tenderness or edema. Normal range of motion.      Cervical back: Normal range of motion and neck supple.     Neurological: She is alert and oriented to person, place, and time. She has normal strength and normal reflexes. No cranial nerve deficit or sensory deficit. GCS score is 15. GCS eye subscore is 4. GCS verbal subscore is 5. GCS motor subscore is 6.   Skin: Skin is warm and dry. Capillary refill takes less than 2 seconds. No rash noted. No erythema.   Psychiatric: She has a normal mood and affect. Her behavior is normal.         ED Course   Procedures  Labs Reviewed   URINALYSIS, REFLEX TO URINE CULTURE - Abnormal; Notable for the following components:       Result Value    Glucose, UA Trace (*)     All other components within normal limits    Narrative:     Preferred Collection Type->Urine, Clean Catch  Specimen Source->Urine   PREGNANCY TEST, URINE RAPID    Narrative:     Specimen Source->Urine          Imaging Results    None          Medications - No data to display  Medical Decision Making:   Differential Diagnosis:   Pregnancy, ectopic pregnancy, bowel obstruction, viral illness,  IBS, etc  ED Management:  Patient's labs are normal, and she is not pregnant.  Unsure of the etiology of her supposed constipation, followed by 1 episode of loose stools and 1 episode of vomiting.  Possibly viral illness.  No evidence of bowel obstruction.  Patient had a heart murmur on exam.  She states that this was 1st found on a previous visit here.  She has not followed up with primary care since then.  Will refer to primary care again.  Patient will be discharged home.  She will follow a bland diet for the next few days and she will return here as needed or worsening UE.                      Clinical Impression:   Final diagnoses:  [A08.4] Viral gastroenteritis (Primary)  [Z32.02] Negative pregnancy test  [R01.1] Heart murmur          ED Disposition Condition    Discharge Stable        ED Prescriptions     None        Follow-up Information    None          Joseph Quinones MD  03/02/22 0153

## 2022-03-02 NOTE — DISCHARGE INSTRUCTIONS
Followup bland diet for the next several days, and drink plenty of liquids.  He should receive a phone call within the next 7-10 days regarding an appointment with family practitioner.  Keep this appointment scheduled.  Return here as needed or if worse in any way.

## 2023-07-19 ENCOUNTER — HOSPITAL ENCOUNTER (EMERGENCY)
Facility: HOSPITAL | Age: 28
Discharge: HOME | End: 2023-07-19
Attending: EMERGENCY MEDICINE
Payer: MEDICAID

## 2023-07-19 VITALS
OXYGEN SATURATION: 98 % | DIASTOLIC BLOOD PRESSURE: 90 MMHG | TEMPERATURE: 100 F | SYSTOLIC BLOOD PRESSURE: 162 MMHG | WEIGHT: 182 LBS | HEIGHT: 67 IN | RESPIRATION RATE: 16 BRPM | HEART RATE: 90 BPM | BODY MASS INDEX: 28.56 KG/M2

## 2023-07-19 DIAGNOSIS — J06.9 VIRAL URI WITH COUGH: Primary | ICD-10-CM

## 2023-07-19 DIAGNOSIS — O99.281 HYPERTHYROIDISM AFFECTING PREGNANCY IN FIRST TRIMESTER: ICD-10-CM

## 2023-07-19 DIAGNOSIS — O20.0 THREATENED MISCARRIAGE IN EARLY PREGNANCY: ICD-10-CM

## 2023-07-19 DIAGNOSIS — R06.02 SOB (SHORTNESS OF BREATH): ICD-10-CM

## 2023-07-19 DIAGNOSIS — E05.90 HYPERTHYROIDISM AFFECTING PREGNANCY IN FIRST TRIMESTER: ICD-10-CM

## 2023-07-19 LAB
ALBUMIN SERPL BCP-MCNC: 3.7 G/DL (ref 3.5–5.2)
ALP SERPL-CCNC: 170 U/L (ref 55–135)
ALT SERPL W/O P-5'-P-CCNC: 22 U/L (ref 10–44)
ANION GAP SERPL CALC-SCNC: 6 MMOL/L (ref 8–16)
AST SERPL-CCNC: 20 U/L (ref 10–40)
BASOPHILS # BLD AUTO: 0.01 K/UL (ref 0–0.2)
BASOPHILS NFR BLD: 0.1 % (ref 0–1.9)
BILIRUB SERPL-MCNC: 0.4 MG/DL (ref 0.1–1)
BILIRUB UR QL STRIP: NEGATIVE
BUN SERPL-MCNC: 6 MG/DL (ref 6–20)
CALCIUM SERPL-MCNC: 9.6 MG/DL (ref 8.7–10.5)
CHLORIDE SERPL-SCNC: 108 MMOL/L (ref 95–110)
CLARITY UR: CLEAR
CO2 SERPL-SCNC: 23 MMOL/L (ref 23–29)
COLOR UR: YELLOW
CREAT SERPL-MCNC: 0.5 MG/DL (ref 0.5–1.4)
DIFFERENTIAL METHOD: ABNORMAL
EOSINOPHIL # BLD AUTO: 0.2 K/UL (ref 0–0.5)
EOSINOPHIL NFR BLD: 2.3 % (ref 0–8)
ERYTHROCYTE [DISTWIDTH] IN BLOOD BY AUTOMATED COUNT: 13 % (ref 11.5–14.5)
EST. GFR  (NO RACE VARIABLE): >60 ML/MIN/1.73 M^2
GLUCOSE SERPL-MCNC: 121 MG/DL (ref 70–110)
GLUCOSE UR QL STRIP: NEGATIVE
HCG INTACT+B SERPL-ACNC: 36 MIU/ML
HCT VFR BLD AUTO: 36.3 % (ref 37–48.5)
HGB BLD-MCNC: 11.7 G/DL (ref 12–16)
HGB UR QL STRIP: ABNORMAL
IMM GRANULOCYTES # BLD AUTO: 0.01 K/UL (ref 0–0.04)
IMM GRANULOCYTES NFR BLD AUTO: 0.1 % (ref 0–0.5)
KETONES UR QL STRIP: NEGATIVE
LEUKOCYTE ESTERASE UR QL STRIP: NEGATIVE
LYMPHOCYTES # BLD AUTO: 1.7 K/UL (ref 1–4.8)
LYMPHOCYTES NFR BLD: 23.7 % (ref 18–48)
MCH RBC QN AUTO: 24.8 PG (ref 27–31)
MCHC RBC AUTO-ENTMCNC: 32.2 G/DL (ref 32–36)
MCV RBC AUTO: 77 FL (ref 82–98)
MONOCYTES # BLD AUTO: 0.7 K/UL (ref 0.3–1)
MONOCYTES NFR BLD: 9.7 % (ref 4–15)
NEUTROPHILS # BLD AUTO: 4.6 K/UL (ref 1.8–7.7)
NEUTROPHILS NFR BLD: 64.1 % (ref 38–73)
NITRITE UR QL STRIP: NEGATIVE
NRBC BLD-RTO: 0 /100 WBC
PH UR STRIP: 8 [PH] (ref 5–8)
PLATELET # BLD AUTO: 206 K/UL (ref 150–450)
PMV BLD AUTO: 9.9 FL (ref 9.2–12.9)
POTASSIUM SERPL-SCNC: 3.8 MMOL/L (ref 3.5–5.1)
PROT SERPL-MCNC: 7.9 G/DL (ref 6–8.4)
PROT UR QL STRIP: NEGATIVE
RBC # BLD AUTO: 4.72 M/UL (ref 4–5.4)
SODIUM SERPL-SCNC: 137 MMOL/L (ref 136–145)
SP GR UR STRIP: 1.02 (ref 1–1.03)
URN SPEC COLLECT METH UR: ABNORMAL
UROBILINOGEN UR STRIP-ACNC: NEGATIVE EU/DL
WBC # BLD AUTO: 7.1 K/UL (ref 3.9–12.7)

## 2023-07-19 PROCEDURE — 76817 TRANSVAGINAL US OBSTETRIC: CPT | Mod: 26,,, | Performed by: RADIOLOGY

## 2023-07-19 PROCEDURE — 80053 COMPREHEN METABOLIC PANEL: CPT | Performed by: EMERGENCY MEDICINE

## 2023-07-19 PROCEDURE — 76817 TRANSVAGINAL US OBSTETRIC: CPT | Mod: TC

## 2023-07-19 PROCEDURE — 84702 CHORIONIC GONADOTROPIN TEST: CPT | Performed by: EMERGENCY MEDICINE

## 2023-07-19 PROCEDURE — 76801 OB US < 14 WKS SINGLE FETUS: CPT | Mod: 26,,, | Performed by: RADIOLOGY

## 2023-07-19 PROCEDURE — 85025 COMPLETE CBC W/AUTO DIFF WBC: CPT | Performed by: EMERGENCY MEDICINE

## 2023-07-19 PROCEDURE — 71046 XR CHEST PA AND LATERAL: ICD-10-PCS | Mod: 26,,, | Performed by: RADIOLOGY

## 2023-07-19 PROCEDURE — 25000003 PHARM REV CODE 250: Performed by: EMERGENCY MEDICINE

## 2023-07-19 PROCEDURE — 76817 US OB <14 WEEKS, TRANSABDOM & TRANSVAG, SINGLE GESTATION (XPD): ICD-10-PCS | Mod: 26,,, | Performed by: RADIOLOGY

## 2023-07-19 PROCEDURE — 81003 URINALYSIS AUTO W/O SCOPE: CPT | Performed by: EMERGENCY MEDICINE

## 2023-07-19 PROCEDURE — 76801 US OB <14 WEEKS, TRANSABDOM & TRANSVAG, SINGLE GESTATION (XPD): ICD-10-PCS | Mod: 26,,, | Performed by: RADIOLOGY

## 2023-07-19 PROCEDURE — 71046 X-RAY EXAM CHEST 2 VIEWS: CPT | Mod: TC

## 2023-07-19 PROCEDURE — 71046 X-RAY EXAM CHEST 2 VIEWS: CPT | Mod: 26,,, | Performed by: RADIOLOGY

## 2023-07-19 PROCEDURE — 99285 EMERGENCY DEPT VISIT HI MDM: CPT | Mod: 25

## 2023-07-19 PROCEDURE — 96360 HYDRATION IV INFUSION INIT: CPT

## 2023-07-19 RX ORDER — PRENATAL WITH FERROUS FUM AND FOLIC ACID 3080; 920; 120; 400; 22; 1.84; 3; 20; 10; 1; 12; 200; 27; 25; 2 [IU]/1; [IU]/1; MG/1; [IU]/1; MG/1; MG/1; MG/1; MG/1; MG/1; MG/1; UG/1; MG/1; MG/1; MG/1; MG/1
1 TABLET ORAL DAILY
Qty: 30 TABLET | Refills: 11 | Status: SHIPPED | OUTPATIENT
Start: 2023-07-19 | End: 2024-07-18

## 2023-07-19 RX ADMIN — SODIUM CHLORIDE 1000 ML: 9 INJECTION, SOLUTION INTRAVENOUS at 10:07

## 2023-07-19 NOTE — ED NOTES
Pt arrives to ED via POV. She states that she was here before but we were taking too long. She states that she has been experiencing congestion and a cough for a few days. She also relays that she is pregnant, unknown how far along, just that she was last sexually active on April 21st. Pt states that from all the coughing, she is having lower abdominal cramping. No reported discharge. No urinary symptoms. Pt is ambulatory with a steady gait without assistance. Breathing is even and unlabored. Will continue to monitor.

## 2023-07-19 NOTE — ED PROVIDER NOTES
Encounter Date: 7/19/2023       History     Chief Complaint   Patient presents with    Cough    Abdominal Cramping     G3 with 2 previous miscarriages presents unknown gestational age of pregnancy.  Patient's last menstrual period was in April she is complaining of crampy lower abdominal pain productive cough generalized malaise mild nausea no vomiting and decreased p.o. intake.      Review of patient's allergies indicates:  No Known Allergies  Past Medical History:   Diagnosis Date    ADHD (attention deficit hyperactivity disorder)     Thyroid disease      History reviewed. No pertinent surgical history.  History reviewed. No pertinent family history.  Social History     Tobacco Use    Smoking status: Some Days     Packs/day: 0.50     Types: Cigarettes    Smokeless tobacco: Never   Substance Use Topics    Alcohol use: Not Currently     Alcohol/week: 7.0 standard drinks     Types: 7 Standard drinks or equivalent per week     Comment: daily drinker    Drug use: Yes     Types: Marijuana     Review of Systems   Constitutional:  Negative for fever.   HENT:  Negative for sore throat.    Respiratory:  Positive for cough. Negative for shortness of breath.    Cardiovascular:  Negative for chest pain.   Gastrointestinal:  Positive for abdominal pain and nausea.   Genitourinary:  Negative for dysuria.   Musculoskeletal:  Negative for back pain.   Skin:  Negative for rash.   Neurological:  Negative for weakness.   Hematological:  Does not bruise/bleed easily.   All other systems reviewed and are negative.    Physical Exam     Initial Vitals [07/19/23 0914]   BP Pulse Resp Temp SpO2   (!) 170/93 (!) 116 18 99.5 °F (37.5 °C) 98 %      MAP       --         Physical Exam    Nursing note and vitals reviewed.  Constitutional: She appears well-developed and well-nourished.   HENT:   Head: Normocephalic and atraumatic.   Dry mucous membranes   Eyes: EOM are normal. Pupils are equal, round, and reactive to light.   Neck: Neck supple.    Normal range of motion.  Cardiovascular:  Regular rhythm, normal heart sounds and intact distal pulses.           Tachycardic   Pulmonary/Chest: Breath sounds normal.   Abdominal: Abdomen is soft.   Musculoskeletal:      Cervical back: Normal range of motion and neck supple.     Neurological: She is alert and oriented to person, place, and time. She has normal strength.   Skin: Skin is warm and dry. Capillary refill takes less than 2 seconds.   Psychiatric: She has a normal mood and affect. Her behavior is normal. Judgment and thought content normal.       ED Course   Procedures  Labs Reviewed   CBC W/ AUTO DIFFERENTIAL - Abnormal; Notable for the following components:       Result Value    Hemoglobin 11.7 (*)     Hematocrit 36.3 (*)     MCV 77 (*)     MCH 24.8 (*)     All other components within normal limits    Narrative:     Release to patient->Immediate   COMPREHENSIVE METABOLIC PANEL - Abnormal; Notable for the following components:    Glucose 121 (*)     Alkaline Phosphatase 170 (*)     Anion Gap 6 (*)     All other components within normal limits    Narrative:     Release to patient->Immediate   URINALYSIS, REFLEX TO URINE CULTURE - Abnormal; Notable for the following components:    Occult Blood UA Trace (*)     All other components within normal limits    Narrative:     Preferred Collection Type->Urine, Clean Catch  Specimen Source->Urine   HCG, QUANTITATIVE    Narrative:     Release to patient->Immediate          Imaging Results              US OB <14 Wks TransAbd & TransVag, Single Gestation (XPD) (Final result)  Result time 07/19/23 11:33:20      Final result by Sanjana Liz MD (07/19/23 11:33:20)                   Impression:      Findings consistent with intrauterine embryonic demise at 7 weeks 0 days.    Two complex cysts in the right ovary, likely hemorrhagic cysts or endometriomas.  These can be followed up with a ultrasound in 6-8 weeks.      Electronically signed by: Sanjana Bates  Agusto  Date:    07/19/2023  Time:    11:33               Narrative:    EXAMINATION:  US OB <14 WEEKS, TRANSABDOM & TRANSVAG, SINGLE GESTATION (XPD)    CLINICAL HISTORY:  R/o ectopic; quantitative beta HCG is 36.  History of vaginal bleeding, cramping    TECHNIQUE:  Transabdominal sonography of the pelvis was performed, followed by transvaginal sonography to better evaluate the uterus and ovaries.    COMPARISON:  No prior ultrasound this pregnancy    FINDINGS:  The uterus measures 6.4 x 4.3 x 5.8 cm.  There is an intrauterine gestational sac measuring 2.5 x 1.6 x 2.3 cm, containing an embryonic pole measuring 9.4 mm, corresponding to an estimated gestational age of 7 weeks 0 days.  No heart motion could be demonstrated by M-mode or color Doppler.    The left ovary was not visualized by transabdominal or endovaginal imaging.    The right ovary measures 5.1 x 5.6 x 3.6 cm and contains 2 round structures of heterogeneous echogenicity, measuring 3.0 cm and 3.2 cm, respectively.  These have an appearance suggestive of hemorrhagic cysts or endometriomas.  There is no internal blood flow within either of them, although there is blood flow to the remainder of the ovary.    There is a small amount of simple appearing pelvic free fluid.                                       X-Ray Chest PA And Lateral (Final result)  Result time 07/19/23 09:54:15      Final result by Sanjana Liz MD (07/19/23 09:54:15)                   Impression:      Increased prominence of interstitial markings with differential as provided above.      Electronically signed by: Sanjana Liz  Date:    07/19/2023  Time:    09:54               Narrative:    EXAMINATION:  XR CHEST PA AND LATERAL    CLINICAL HISTORY:  Shortness of breath    TECHNIQUE:  PA and lateral views of the chest were performed.    COMPARISON:  06/26/2021    FINDINGS:  The heart size is borderline.  Interval increase in prominence of interstitial markings is noted with an  appearance suggestive of mild pulmonary vascular congestion.  Bronchitis or viral infection could produce a similar appearance.  There is no focal consolidation or pleural effusion.  No pneumothorax.  Bones are intact.                                       Medications   sodium chloride 0.9% bolus 1,000 mL 1,000 mL (0 mLs Intravenous Stopped 7/19/23 1151)     Medical Decision Making:   Initial Assessment:   27-year-old female, pregnant likely in her 1st trimester here with generalized malaise lower abdominal cramping no previous ultrasound in this pregnancy no prenatal care no primary care.  Patient states she has a history of hyperthyroid which is currently not treated.  She states she still has Medicaid through [place] and if so will need to follow up with physicians there.  She denies vaginal bleeding or discharge no concern for STD.  Patient unsure of what medications are safe in pregnancy states she is been taking cold and flu over-the-counter as well as Tylenol.  I told her that most cold and flu medications available are not safe in pregnancy.  I informed her that Tylenol with inappropriate dose and regimen is safe in pregnancy, that Benadryl is safe in pregnancy warm tea with honey lemon is safe in pregnancy I also informed her melatonin also in an appropriate dose in regimen  Differential Diagnosis:   Dehydration, ectopic pregnancy, urinary tract infection, STD, influenza, pneumonia  Clinical Tests:   Lab Tests: Ordered and Reviewed  The following lab test(s) were unremarkable: CBC, CMP, B-HCG and Urinalysis  Radiological Study: Ordered and Reviewed  ED Management:  Patient initially refused labs, I was able to convince her to allow me to check her bHCG.     BHCG is 36, below discriminatory zone, ultrasound shows an IUP without heart tones consistent with likely eventual impending miscarriage.  Pending urinalysis patient will receive IV fluids and has already been told to return to an emergency department  in the next 48-72 hours for repeat beta quant and ultrasound for definitive diagnosis.    US consistent with failed IUP. I informed the patient, I placed FM and OBGYN referrals in for her, gave her Rx for prenatal vitamins.                         Clinical Impression:   Final diagnoses:  [R06.02] SOB (shortness of breath)  [J06.9] Viral URI with cough (Primary)  [O20.0] Threatened miscarriage in early pregnancy  [O99.281, E05.90] Hyperthyroidism affecting pregnancy in first trimester        ED Disposition Condition    Discharge Stable          ED Prescriptions       Medication Sig Dispense Start Date End Date Auth. Provider    PNV,calcium 72/iron/folic acid (PRENATAL VITAMIN) Tab Take 1 tablet by mouth once daily. 30 tablet 7/19/2023 7/18/2024 Marian Castro MD          Follow-up Information    None          Marian Castro MD  07/20/23 0602

## 2024-05-01 ENCOUNTER — HOSPITAL ENCOUNTER (EMERGENCY)
Facility: HOSPITAL | Age: 29
Discharge: HOME | End: 2024-05-02
Attending: FAMILY MEDICINE
Payer: MEDICAID

## 2024-05-01 VITALS
RESPIRATION RATE: 24 BRPM | OXYGEN SATURATION: 100 % | HEIGHT: 67 IN | BODY MASS INDEX: 28.72 KG/M2 | HEART RATE: 103 BPM | TEMPERATURE: 98 F | WEIGHT: 183 LBS | SYSTOLIC BLOOD PRESSURE: 170 MMHG | DIASTOLIC BLOOD PRESSURE: 83 MMHG

## 2024-05-01 DIAGNOSIS — R07.89 CHEST WALL PAIN: Primary | ICD-10-CM

## 2024-05-01 DIAGNOSIS — Z86.39 HISTORY OF HYPERTHYROIDISM: ICD-10-CM

## 2024-05-01 DIAGNOSIS — R07.9 CHEST PAIN: ICD-10-CM

## 2024-05-01 DIAGNOSIS — R21 RASH OF FOOT: ICD-10-CM

## 2024-05-01 DIAGNOSIS — R05.9 COUGH, UNSPECIFIED TYPE: ICD-10-CM

## 2024-05-01 LAB
ALBUMIN SERPL BCP-MCNC: 4 G/DL (ref 3.5–5.2)
ALP SERPL-CCNC: 210 U/L (ref 55–135)
ALT SERPL W/O P-5'-P-CCNC: 46 U/L (ref 10–44)
ANION GAP SERPL CALC-SCNC: 13 MMOL/L (ref 8–16)
AST SERPL-CCNC: 38 U/L (ref 10–40)
BASOPHILS # BLD AUTO: 0.02 K/UL (ref 0–0.2)
BASOPHILS NFR BLD: 0.3 % (ref 0–1.9)
BILIRUB SERPL-MCNC: 0.3 MG/DL (ref 0.1–1)
BUN SERPL-MCNC: 5 MG/DL (ref 6–20)
CALCIUM SERPL-MCNC: 9.5 MG/DL (ref 8.7–10.5)
CHLORIDE SERPL-SCNC: 104 MMOL/L (ref 95–110)
CK SERPL-CCNC: 39 U/L (ref 20–180)
CO2 SERPL-SCNC: 23 MMOL/L (ref 23–29)
CREAT SERPL-MCNC: 0.6 MG/DL (ref 0.5–1.4)
DIFFERENTIAL METHOD BLD: ABNORMAL
EOSINOPHIL # BLD AUTO: 0.3 K/UL (ref 0–0.5)
EOSINOPHIL NFR BLD: 3.6 % (ref 0–8)
ERYTHROCYTE [DISTWIDTH] IN BLOOD BY AUTOMATED COUNT: 12.3 % (ref 11.5–14.5)
EST. GFR  (NO RACE VARIABLE): >60 ML/MIN/1.73 M^2
GLUCOSE SERPL-MCNC: 136 MG/DL (ref 70–110)
HCT VFR BLD AUTO: 43.2 % (ref 37–48.5)
HGB BLD-MCNC: 14.3 G/DL (ref 12–16)
IMM GRANULOCYTES # BLD AUTO: 0.01 K/UL (ref 0–0.04)
IMM GRANULOCYTES NFR BLD AUTO: 0.1 % (ref 0–0.5)
LYMPHOCYTES # BLD AUTO: 3.2 K/UL (ref 1–4.8)
LYMPHOCYTES NFR BLD: 44.2 % (ref 18–48)
MAGNESIUM SERPL-MCNC: 1.7 MG/DL (ref 1.6–2.6)
MCH RBC QN AUTO: 26.4 PG (ref 27–31)
MCHC RBC AUTO-ENTMCNC: 33.1 G/DL (ref 32–36)
MCV RBC AUTO: 80 FL (ref 82–98)
MONOCYTES # BLD AUTO: 0.5 K/UL (ref 0.3–1)
MONOCYTES NFR BLD: 7.1 % (ref 4–15)
NEUTROPHILS # BLD AUTO: 3.2 K/UL (ref 1.8–7.7)
NEUTROPHILS NFR BLD: 44.7 % (ref 38–73)
NRBC BLD-RTO: 0 /100 WBC
PLATELET # BLD AUTO: 255 K/UL (ref 150–450)
PMV BLD AUTO: 11.5 FL (ref 9.2–12.9)
POTASSIUM SERPL-SCNC: 3.8 MMOL/L (ref 3.5–5.1)
PROT SERPL-MCNC: 8.8 G/DL (ref 6–8.4)
RBC # BLD AUTO: 5.41 M/UL (ref 4–5.4)
SODIUM SERPL-SCNC: 140 MMOL/L (ref 136–145)
WBC # BLD AUTO: 7.2 K/UL (ref 3.9–12.7)

## 2024-05-01 PROCEDURE — 85025 COMPLETE CBC W/AUTO DIFF WBC: CPT | Performed by: FAMILY MEDICINE

## 2024-05-01 PROCEDURE — 82550 ASSAY OF CK (CPK): CPT | Performed by: FAMILY MEDICINE

## 2024-05-01 PROCEDURE — 83735 ASSAY OF MAGNESIUM: CPT | Performed by: FAMILY MEDICINE

## 2024-05-01 PROCEDURE — 80053 COMPREHEN METABOLIC PANEL: CPT | Performed by: FAMILY MEDICINE

## 2024-05-01 PROCEDURE — 84439 ASSAY OF FREE THYROXINE: CPT | Performed by: FAMILY MEDICINE

## 2024-05-01 PROCEDURE — 96374 THER/PROPH/DIAG INJ IV PUSH: CPT

## 2024-05-01 PROCEDURE — 87389 HIV-1 AG W/HIV-1&-2 AB AG IA: CPT | Performed by: FAMILY MEDICINE

## 2024-05-01 PROCEDURE — 84443 ASSAY THYROID STIM HORMONE: CPT | Performed by: FAMILY MEDICINE

## 2024-05-01 PROCEDURE — 99285 EMERGENCY DEPT VISIT HI MDM: CPT | Mod: 25

## 2024-05-01 PROCEDURE — 71045 X-RAY EXAM CHEST 1 VIEW: CPT | Mod: 26,,, | Performed by: RADIOLOGY

## 2024-05-01 PROCEDURE — 93005 ELECTROCARDIOGRAM TRACING: CPT

## 2024-05-01 PROCEDURE — 63600175 PHARM REV CODE 636 W HCPCS: Performed by: FAMILY MEDICINE

## 2024-05-01 PROCEDURE — 86803 HEPATITIS C AB TEST: CPT | Performed by: FAMILY MEDICINE

## 2024-05-01 PROCEDURE — 71045 X-RAY EXAM CHEST 1 VIEW: CPT | Mod: TC

## 2024-05-01 PROCEDURE — 93010 ELECTROCARDIOGRAM REPORT: CPT | Mod: ,,, | Performed by: INTERNAL MEDICINE

## 2024-05-01 PROCEDURE — 84484 ASSAY OF TROPONIN QUANT: CPT | Performed by: FAMILY MEDICINE

## 2024-05-01 RX ORDER — BENZONATATE 100 MG/1
200 CAPSULE ORAL ONCE
Status: COMPLETED | OUTPATIENT
Start: 2024-05-02 | End: 2024-05-01

## 2024-05-01 RX ORDER — KETOROLAC TROMETHAMINE 30 MG/ML
30 INJECTION, SOLUTION INTRAMUSCULAR; INTRAVENOUS
Status: COMPLETED | OUTPATIENT
Start: 2024-05-01 | End: 2024-05-01

## 2024-05-01 RX ADMIN — BENZONATATE 200 MG: 100 CAPSULE ORAL at 11:05

## 2024-05-01 RX ADMIN — KETOROLAC TROMETHAMINE 30 MG: 30 INJECTION, SOLUTION INTRAMUSCULAR; INTRAVENOUS at 11:05

## 2024-05-02 LAB
HCV AB SERPL QL IA: NORMAL
HIV 1+2 AB+HIV1 P24 AG SERPL QL IA: NORMAL
OHS QRS DURATION: 82 MS
OHS QTC CALCULATION: 477 MS
T4 FREE SERPL-MCNC: 1.98 NG/DL (ref 0.71–1.51)
TROPONIN I SERPL DL<=0.01 NG/ML-MCNC: <0.006 NG/ML (ref 0–0.03)
TSH SERPL DL<=0.005 MIU/L-ACNC: <0.01 UIU/ML (ref 0.4–4)

## 2024-05-02 PROCEDURE — 25000003 PHARM REV CODE 250: Performed by: FAMILY MEDICINE

## 2024-05-02 RX ORDER — BENZONATATE 200 MG/1
200 CAPSULE ORAL 3 TIMES DAILY PRN
Qty: 30 CAPSULE | Refills: 0 | Status: SHIPPED | OUTPATIENT
Start: 2024-05-02 | End: 2024-05-12

## 2024-05-02 RX ORDER — HYDROCORTISONE 1 %
CREAM (GRAM) TOPICAL 2 TIMES DAILY
Qty: 30 G | Refills: 1 | Status: SHIPPED | OUTPATIENT
Start: 2024-05-02

## 2024-05-02 RX ORDER — IBUPROFEN 800 MG/1
800 TABLET ORAL EVERY 6 HOURS PRN
Qty: 30 TABLET | Refills: 0 | Status: SHIPPED | OUTPATIENT
Start: 2024-05-02

## 2024-05-02 NOTE — ED PROVIDER NOTES
Encounter Date: 5/1/2024       History     Chief Complaint   Patient presents with    Chest Pain     Patient with c/o chest pain, left arm tingling/numbness, and cough that has been ongoing over the past few days; Patient denies cardiac history; States she has been under a lot of stress/anxiety lately     The patient is a 28-year-old patient with a history of ADHD and hyperthyroidism.  Patient comes our facility with complaints of sharp chest pain bilaterally to her anterior chest over the last 2 days.  Patient states that the pain is intermittent lasting a few minutes at a time.  However today the pain has been consistent in all day long.  Patient denies any waxing and waning symptoms.  Patient states that the pain is worse on the right versus the left.  She denies any chest trauma or similar previous events.  The patient complains of a cough that has been ongoing for about 3 days.  Patient denies any dyspnea associated.  Patient complains of some paresthesias going down her lateral aspect of left upper extremity from shoulder to elbow.  Patient complains of rash to dorsal aspect of both feet that has been ongoing for about 1 year.  Rash is pruritic.  No wounds to the foot.  No rashes anywhere else.  Patient has not tried any over-the-counter treatments or sought medical attention over the last 1 year for this rash.      Review of patient's allergies indicates:  No Known Allergies  Past Medical History:   Diagnosis Date    ADHD (attention deficit hyperactivity disorder)     Thyroid disease      No past surgical history on file.  No family history on file.  Social History     Tobacco Use    Smoking status: Some Days     Current packs/day: 0.50     Types: Cigarettes    Smokeless tobacco: Never   Substance Use Topics    Alcohol use: Not Currently     Alcohol/week: 7.0 standard drinks of alcohol     Types: 7 Standard drinks or equivalent per week     Comment: daily drinker    Drug use: Yes     Types: Marijuana      Review of Systems   Respiratory:  Positive for cough. Negative for shortness of breath.    Cardiovascular:  Positive for chest pain. Negative for palpitations and leg swelling.   Skin:  Positive for rash.   Neurological:         Paresthesias to left upper extremity   All other systems reviewed and are negative.      Physical Exam     Initial Vitals [05/01/24 2159]   BP Pulse Resp Temp SpO2   (!) 170/83 105 16 97.9 °F (36.6 °C) 100 %      MAP       --         Physical Exam    Nursing note and vitals reviewed.  Constitutional: She appears well-developed and well-nourished. She is not diaphoretic. No distress.   HENT:   Head: Normocephalic and atraumatic.   Nose: Nose normal.   Goiter   Eyes: Conjunctivae and EOM are normal. Right eye exhibits no discharge. Left eye exhibits no discharge.   Neck: Neck supple. No thyromegaly present.   Normal range of motion.  Cardiovascular:  Normal rate, regular rhythm, normal heart sounds and intact distal pulses.           No murmur heard.  Pulmonary/Chest: Breath sounds normal. No respiratory distress. She has no wheezes. She has no rhonchi. She has no rales. She exhibits tenderness.   Abdominal: Abdomen is soft. Bowel sounds are normal. She exhibits no distension. There is no abdominal tenderness.   Musculoskeletal:         General: No tenderness or edema. Normal range of motion.      Cervical back: Normal range of motion and neck supple.     Lymphadenopathy:     She has no cervical adenopathy.   Neurological: She is alert and oriented to person, place, and time. She has normal strength. GCS score is 15. GCS eye subscore is 4. GCS verbal subscore is 5. GCS motor subscore is 6.   Mild paresthesias noted lateral aspect of left arm from elbow to shoulder.  This did resolve during ER course.  Otherwise neurologic evaluation was within normal limits.   Skin: Skin is warm and dry. Capillary refill takes less than 2 seconds. Rash noted. No abscess noted. No pallor.   Dry  hyperpigmented rash to dorsal aspect of both feet.  No erythema, no raised lesions, no open wounds.   Psychiatric: She has a normal mood and affect. Her behavior is normal. Judgment and thought content normal.         ED Course   Procedures  Labs Reviewed   CBC W/ AUTO DIFFERENTIAL - Abnormal; Notable for the following components:       Result Value    RBC 5.41 (*)     MCV 80 (*)     MCH 26.4 (*)     All other components within normal limits   COMPREHENSIVE METABOLIC PANEL - Abnormal; Notable for the following components:    Glucose 136 (*)     BUN 5 (*)     Total Protein 8.8 (*)     Alkaline Phosphatase 210 (*)     ALT 46 (*)     All other components within normal limits   TSH - Abnormal; Notable for the following components:    TSH <0.010 (*)     All other components within normal limits   T4, FREE - Abnormal; Notable for the following components:    Free T4 1.98 (*)     All other components within normal limits   TROPONIN I   CK   MAGNESIUM   HIV 1 / 2 ANTIBODY   HEPATITIS C ANTIBODY   PREGNANCY TEST, URINE RAPID     EKG Readings: (Independently Interpreted)   Sinus tachycardia with ventricular rate of 107.  No ST elevations or depressions.       Imaging Results              X-Ray Chest AP Portable (Final result)  Result time 05/01/24 23:25:29      Final result by Carloz Willams MD (05/01/24 23:25:29)                   Impression:      No radiographic evidence of acute intrathoracic process on this single view.      Electronically signed by: Carloz Willams MD  Date:    05/01/2024  Time:    23:25               Narrative:    EXAMINATION:  XR CHEST AP PORTABLE    CLINICAL HISTORY:  Chest pain, unspecified    TECHNIQUE:  Single frontal view of the chest was performed.    COMPARISON:  07/19/2023    FINDINGS:  Cardiac monitoring leads overlie the chest.  Cardiac silhouette is not significantly enlarged.  Lungs are symmetrically expanded without evidence of confluent airspace consolidation.  No significant volume  of pleural fluid or pneumothorax identified.  Osseous structures appear intact.                                       Medications   ketorolac injection 30 mg (30 mg Intravenous Given 5/1/24 2304)   benzonatate capsule 200 mg (200 mg Oral Given 5/1/24 2304)     Medical Decision Making  Patient comes our facility with apparent chest wall pain or atypical chest pain over the last few days.  Patient has a cough associated.  She denies any dyspnea.  She denies any fevers or chills.  Patient additionally complained of a rash on her foot that has been chronic for over a year.  Patient also complained of some paresthesia to her left shoulder to elbow on lateral aspect of arm.  Cardiac workup was negative for any signs of ischemia.  EKG showed mild tachycardia but no ischemia.  Patient was given Toradol with complete resolution of chest pain as well as paresthesias to left upper extremity.  Patient was given Tessalon Perles resolution of cough.  Patient was prescribed hydrocortisone 1% cream for her rash to her feet.  Patient was discharged home to follow-up with family practice.  Referral to Family Medicine was made.    TSH and free T4 were pending at the time of discharge.  I informed the patient that they were pending.  The patient stated she wished to go home and would discuss her thyroid findings with her PCP.  Patient has had a longstanding goiter.  She has not been in any hyper thyroid medications in many years.    Amount and/or Complexity of Data Reviewed  Labs: ordered. Decision-making details documented in ED Course.  Radiology: ordered. Decision-making details documented in ED Course.  ECG/medicine tests:  Decision-making details documented in ED Course.    Risk  Prescription drug management.                                      Clinical Impression:  Final diagnoses:  [R07.9] Chest pain - Chest wall pain  [R07.89] Chest wall pain (Primary)  [R05.9] Cough, unspecified type  [Z86.39] History of  hyperthyroidism  [R21] Rash of foot          ED Disposition Condition    Discharge Stable          ED Prescriptions       Medication Sig Dispense Start Date End Date Auth. Provider    benzonatate (TESSALON) 200 MG capsule Take 1 capsule (200 mg total) by mouth 3 (three) times daily as needed for Cough. 30 capsule 5/2/2024 5/12/2024 Luke Perkins MD    hydrocortisone 1 % cream Apply topically 2 (two) times daily. Apply to affected area twice daily until rash resolves.  If needed beyond 2 weeks, please discuss with primary care physician. 30 g 5/2/2024 -- Luke Perkins MD    ibuprofen (ADVIL,MOTRIN) 800 MG tablet Take 1 tablet (800 mg total) by mouth every 6 (six) hours as needed for Pain. 30 tablet 5/2/2024 -- Luke Perkins MD          Follow-up Information    None     Follow-up with family Medicine, referral has been made.     Luke Perkins MD  05/02/24 0113

## 2024-08-17 ENCOUNTER — HOSPITAL ENCOUNTER (EMERGENCY)
Facility: HOSPITAL | Age: 29
Discharge: HOME | End: 2024-08-17

## 2024-08-17 VITALS
BODY MASS INDEX: 31.04 KG/M2 | RESPIRATION RATE: 18 BRPM | HEIGHT: 67 IN | SYSTOLIC BLOOD PRESSURE: 175 MMHG | TEMPERATURE: 98 F | WEIGHT: 197.75 LBS | OXYGEN SATURATION: 100 % | HEART RATE: 96 BPM | DIASTOLIC BLOOD PRESSURE: 105 MMHG

## 2024-08-17 DIAGNOSIS — R03.0 ELEVATED BLOOD PRESSURE READING WITHOUT DIAGNOSIS OF HYPERTENSION: ICD-10-CM

## 2024-08-17 DIAGNOSIS — B37.31 YEAST VAGINITIS: Primary | ICD-10-CM

## 2024-08-17 PROBLEM — E05.90 HYPERTHYROIDISM: Status: ACTIVE | Noted: 2024-08-17

## 2024-08-17 LAB
BACTERIA GENITAL QL WET PREP: ABNORMAL
CLUE CELLS VAG QL WET PREP: ABNORMAL
FILAMENT FUNGI VAG WET PREP-#/AREA: ABNORMAL
SPECIMEN SOURCE: ABNORMAL
T VAGINALIS GENITAL QL WET PREP: ABNORMAL
WBC #/AREA VAG WET PREP: ABNORMAL
YEAST GENITAL QL WET PREP: ABNORMAL

## 2024-08-17 PROCEDURE — 87491 CHLMYD TRACH DNA AMP PROBE: CPT | Performed by: NURSE PRACTITIONER

## 2024-08-17 PROCEDURE — 87210 SMEAR WET MOUNT SALINE/INK: CPT | Performed by: NURSE PRACTITIONER

## 2024-08-17 PROCEDURE — 87591 N.GONORRHOEAE DNA AMP PROB: CPT | Performed by: NURSE PRACTITIONER

## 2024-08-17 PROCEDURE — 25000003 PHARM REV CODE 250: Performed by: NURSE PRACTITIONER

## 2024-08-17 PROCEDURE — 99284 EMERGENCY DEPT VISIT MOD MDM: CPT

## 2024-08-17 RX ORDER — NYSTATIN 100000 U/G
CREAM TOPICAL 2 TIMES DAILY
Qty: 30 G | Refills: 0 | Status: SHIPPED | OUTPATIENT
Start: 2024-08-17

## 2024-08-17 RX ORDER — CLONIDINE HYDROCHLORIDE 0.1 MG/1
0.1 TABLET ORAL
Status: COMPLETED | OUTPATIENT
Start: 2024-08-17 | End: 2024-08-17

## 2024-08-17 RX ORDER — FLUCONAZOLE 100 MG/1
100 TABLET ORAL DAILY
Qty: 3 TABLET | Refills: 0 | Status: SHIPPED | OUTPATIENT
Start: 2024-08-17

## 2024-08-17 RX ADMIN — CLONIDINE HYDROCHLORIDE 0.1 MG: 0.1 TABLET ORAL at 01:08

## 2024-08-17 NOTE — ED PROVIDER NOTES
"Encounter Date: 8/17/2024       History     Chief Complaint   Patient presents with    Vaginal Itching     Pt c/o vaginal itching and irritation for approx 1 week. Pt reports has used two doses of OTC monistat without relief of symptoms.      "I have a yeast infection". States started after using lavender soap. Having vaginal irritation. Denies vaginal discharge. States wears a diaper at night for nocturnal enuresis and has perineal irritation from the diaper since the yeast infection. Denies ulcerations.       Review of patient's allergies indicates:  No Known Allergies  Past Medical History:   Diagnosis Date    ADHD (attention deficit hyperactivity disorder)     Thyroid disease      No past surgical history on file.  No family history on file.  Social History     Tobacco Use    Smoking status: Some Days     Current packs/day: 0.50     Types: Cigarettes    Smokeless tobacco: Never   Substance Use Topics    Alcohol use: Not Currently     Alcohol/week: 7.0 standard drinks of alcohol     Types: 7 Standard drinks or equivalent per week     Comment: daily drinker    Drug use: Yes     Types: Marijuana     Review of Systems   Constitutional:  Negative for fever.   Gastrointestinal:  Negative for abdominal pain.   Genitourinary:  Positive for vaginal bleeding and vaginal discharge. Negative for dysuria and pelvic pain.        Nocturnal enuresis since childhood       Physical Exam     Initial Vitals   BP Pulse Resp Temp SpO2   08/17/24 1037 08/17/24 1035 08/17/24 1035 08/17/24 1035 08/17/24 1035   (!) 182/101 (!) 120 18 98.3 °F (36.8 °C) 99 %      MAP       --                Physical Exam    Nursing note and vitals reviewed.  Constitutional: She appears well-developed and well-nourished. She appears distressed.   HENT:   Head: Normocephalic.   Eyes: EOM are normal.   Neck: Neck supple.   Cardiovascular:  Normal rate, regular rhythm and normal heart sounds.           Pulmonary/Chest: Breath sounds normal.   Abdominal: " "Abdomen is soft. There is no abdominal tenderness.   Genitourinary:    Genitourinary Comments: External structures of perineum red/excoriated. Menstrual blood in vault and cervix. No lesions or ulcerations. Cervix midline, non tender to palpation     Musculoskeletal:      Cervical back: Neck supple.     Neurological: She is alert and oriented to person, place, and time.   Skin: Skin is warm and dry. Capillary refill takes less than 2 seconds.   Psychiatric: She has a normal mood and affect.         ED Course   Procedures  Labs Reviewed   VAGINAL SCREEN - Abnormal       Result Value    Trichomonas None      Clue Cells None      Budding Yeast Few (*)     Fungal Hyphae Moderate (*)     WBC - Vaginal Screen Moderate (*)     Bacteria - Vaginal Screen Many (*)     Wet Prep Source VAG      Narrative:     Specimen Source->Vagina  Release to patient->Immediate   C. TRACHOMATIS/N. GONORRHOEAE BY AMP DNA          Imaging Results    None          Medications   cloNIDine tablet 0.1 mg (0.1 mg Oral Given 8/17/24 1328)     Medical Decision Making  28 year old female complains of yeast infection, vaginal irritation      Differential Diagnoses: candidiasis, GC, Chlamydia, vaginitis, BV    Amount and/or Complexity of Data Reviewed  Labs: ordered. Decision-making details documented in ED Course.    Risk  Prescription drug management.  Risk Details: BP elevated throught visit. States she does not have PCP but has had elevated BP for "long time" . Has not been on meds previously. Will refer to primary care to establish care with PCP               ED Course as of 08/17/24 1443   Sat Aug 17, 2024   1201 Vaginal Screen(!)  + yeast  No clue cells  Moderate bacteria [NP]   1202 C. trachomatis/N. gonorrhoeae by AMP DNA [facility]; Vagina  GC/Chlamydia Pending [NP]      ED Course User Index  [NP] Keely Olmstead, NIEVES                           Clinical Impression:  Final diagnoses:  [B37.31] Yeast vaginitis (Primary)  [R03.0] Elevated blood " pressure reading without diagnosis of hypertension          ED Disposition Condition    Discharge Good          ED Prescriptions       Medication Sig Dispense Start Date End Date Auth. Provider    fluconazole (DIFLUCAN) 100 MG tablet Take 1 tablet (100 mg total) by mouth once daily. 3 tablet 8/17/2024 -- Keely Olmstead FNP    nystatin (MYCOSTATIN) cream Apply topically 2 (two) times daily. 30 g 8/17/2024 -- Keely Olmstead FNP          Follow-up Information    None          Keley Olmstead FNP  08/17/24 4245

## 2024-08-17 NOTE — DISCHARGE INSTRUCTIONS
A referral was sent to establish care with family practice. They should call you to schedule    Your blood pressure was high today. You need to see primary care for hypertension management

## 2024-08-17 NOTE — ED NOTES
"Reassessed vitals and NP aware. Pt is visibly upset stating she is too young to have these health issues " hyperthyroid, hypertension, visual issues". Advised pt I have informed the  provider of elevated bp and she will prescribe clonidine to help lower bp. NP notified of pts current state.   "

## 2024-08-18 LAB
C TRACH DNA SPEC QL NAA+PROBE: NOT DETECTED
N GONORRHOEA DNA SPEC QL NAA+PROBE: NOT DETECTED

## 2024-10-08 ENCOUNTER — HOSPITAL ENCOUNTER (EMERGENCY)
Facility: HOSPITAL | Age: 29
Discharge: HOME | End: 2024-10-08
Attending: EMERGENCY MEDICINE

## 2024-10-08 VITALS
HEART RATE: 92 BPM | HEIGHT: 67 IN | OXYGEN SATURATION: 100 % | DIASTOLIC BLOOD PRESSURE: 106 MMHG | RESPIRATION RATE: 20 BRPM | SYSTOLIC BLOOD PRESSURE: 171 MMHG | TEMPERATURE: 98 F | BODY MASS INDEX: 30.92 KG/M2 | WEIGHT: 197 LBS

## 2024-10-08 DIAGNOSIS — N89.8 VAGINAL ITCHING: Primary | ICD-10-CM

## 2024-10-08 LAB
B-HCG UR QL: NEGATIVE
BACTERIA #/AREA URNS HPF: ABNORMAL /HPF
BACTERIA GENITAL QL WET PREP: ABNORMAL
BILIRUB UR QL STRIP: ABNORMAL
CLARITY UR: CLEAR
CLUE CELLS VAG QL WET PREP: ABNORMAL
COLOR UR: YELLOW
FILAMENT FUNGI VAG WET PREP-#/AREA: ABNORMAL
GLUCOSE UR QL STRIP: ABNORMAL
HGB UR QL STRIP: ABNORMAL
HYALINE CASTS #/AREA URNS LPF: 3 /LPF
KETONES UR QL STRIP: NEGATIVE
LEUKOCYTE ESTERASE UR QL STRIP: NEGATIVE
MICROSCOPIC COMMENT: ABNORMAL
NITRITE UR QL STRIP: NEGATIVE
PH UR STRIP: 6 [PH] (ref 5–8)
PROT UR QL STRIP: ABNORMAL
RBC #/AREA URNS HPF: 4 /HPF (ref 0–4)
SP GR UR STRIP: 1.02 (ref 1–1.03)
SPECIMEN SOURCE: ABNORMAL
T VAGINALIS GENITAL QL WET PREP: ABNORMAL
URN SPEC COLLECT METH UR: ABNORMAL
UROBILINOGEN UR STRIP-ACNC: NEGATIVE EU/DL
WBC #/AREA URNS HPF: 2 /HPF (ref 0–5)
WBC #/AREA VAG WET PREP: ABNORMAL
YEAST GENITAL QL WET PREP: ABNORMAL

## 2024-10-08 PROCEDURE — 81000 URINALYSIS NONAUTO W/SCOPE: CPT | Performed by: NURSE PRACTITIONER

## 2024-10-08 PROCEDURE — 87210 SMEAR WET MOUNT SALINE/INK: CPT | Performed by: NURSE PRACTITIONER

## 2024-10-08 PROCEDURE — 81025 URINE PREGNANCY TEST: CPT | Performed by: NURSE PRACTITIONER

## 2024-10-08 PROCEDURE — 87491 CHLMYD TRACH DNA AMP PROBE: CPT | Performed by: NURSE PRACTITIONER

## 2024-10-08 PROCEDURE — 99284 EMERGENCY DEPT VISIT MOD MDM: CPT

## 2024-10-08 PROCEDURE — 87591 N.GONORRHOEAE DNA AMP PROB: CPT | Performed by: NURSE PRACTITIONER

## 2024-10-08 RX ORDER — ALBUTEROL SULFATE 90 UG/1
2 INHALANT RESPIRATORY (INHALATION) EVERY 4 HOURS PRN
Qty: 18 G | Refills: 4 | Status: SHIPPED | OUTPATIENT
Start: 2024-10-08

## 2024-10-08 RX ORDER — FLUCONAZOLE 150 MG/1
TABLET ORAL
Qty: 2 TABLET | Refills: 0 | Status: SHIPPED | OUTPATIENT
Start: 2024-10-08

## 2024-10-08 NOTE — ED PROVIDER NOTES
"Encounter Date: 10/8/2024       History     Chief Complaint   Patient presents with    Vaginal Itching     States that she was treated for a yeast infection about a month ago but has not improved. Reports that she is taking baths three times a day for symptoms are persistent. Denies vaginal discharge. Reports foul smell. Complains of internal and external vaginal itching. Denies dysuria. History of enuresis. Wears pulls ups at night. Patient states, "I want to be tested for sexual diseases." Patient does not have PCP and requests referral to [facility] to establish care.     POV to the ED alone.  Patient complains of intermittent vaginal itching for one month. States she did take some pills one month ago that helped the itching, but the itching returned. Denies abdomen pain, denies N/VD/F. Denies vaginal discharge. States her partner is out of town now for about 2 months. No concerns for pregnancy or STD. No other complaints    The history is provided by the patient. No  was used.     Review of patient's allergies indicates:  No Known Allergies  Past Medical History:   Diagnosis Date    ADHD (attention deficit hyperactivity disorder)     Hypertension     Thyroid disease      History reviewed. No pertinent surgical history.  No family history on file.  Social History     Tobacco Use    Smoking status: Every Day     Current packs/day: 0.50     Types: Cigarettes    Smokeless tobacco: Never   Substance Use Topics    Alcohol use: Not Currently     Alcohol/week: 7.0 standard drinks of alcohol     Types: 7 Standard drinks or equivalent per week     Comment: daily drinker    Drug use: Not Currently     Types: Marijuana     Review of Systems   Constitutional:  Negative for chills and fever.   Genitourinary:  Negative for difficulty urinating, dysuria, flank pain, vaginal bleeding, vaginal discharge and vaginal pain.        Vaginal itching   All other systems reviewed and are " negative.      Physical Exam     Initial Vitals [10/08/24 1105]   BP Pulse Resp Temp SpO2   (!) 178/109 102 20 98.4 °F (36.9 °C) 100 %      MAP       --         Physical Exam    Nursing note and vitals reviewed.  Constitutional: She appears well-developed and well-nourished. No distress.   HENT:   Head: Normocephalic and atraumatic. Mouth/Throat: Oropharynx is clear and moist.   Eyes: Pupils are equal, round, and reactive to light.   Neck:   Normal range of motion.  Cardiovascular:  Normal rate and regular rhythm.           Pulmonary/Chest: Breath sounds normal. No respiratory distress.   Abdominal: Abdomen is soft. There is no abdominal tenderness.   Musculoskeletal:         General: Normal range of motion.      Cervical back: Normal range of motion.     Neurological: She is alert and oriented to person, place, and time. GCS score is 15. GCS eye subscore is 4. GCS verbal subscore is 5. GCS motor subscore is 6.   Skin: Skin is warm and dry. Capillary refill takes less than 2 seconds.   Psychiatric: She has a normal mood and affect. Thought content normal.         ED Course   Procedures  Labs Reviewed   VAGINAL SCREEN - Abnormal       Result Value    Trichomonas None      Clue Cells None      Budding Yeast None      Fungal Hyphae None      WBC - Vaginal Screen Occasional (*)     Bacteria - Vaginal Screen Rare (*)     Wet Prep Source VAG      Narrative:     Specimen Source->Vagina  Release to patient->Immediate   URINALYSIS, REFLEX TO URINE CULTURE - Abnormal    Specimen UA Urine, Unspecified      Color, UA Yellow      Appearance, UA Clear      pH, UA 6.0      Specific Gravity, UA 1.020      Protein, UA 1+ (*)     Glucose, UA 2+ (*)     Ketones, UA Negative      Bilirubin (UA) 1+ (*)     Occult Blood UA Trace (*)     Nitrite, UA Negative      Urobilinogen, UA Negative      Leukocytes, UA Negative      Narrative:     Preferred Collection Type->Urine, Clean Catch  Specimen Source->Urine   URINALYSIS MICROSCOPIC -  Abnormal    RBC, UA 4      WBC, UA 2      Bacteria Rare      Hyaline Casts, UA 3 (*)     Microscopic Comment SEE COMMENT      Narrative:     Preferred Collection Type->Urine, Clean Catch  Specimen Source->Urine   PREGNANCY TEST, URINE RAPID    Preg Test, Ur Negative      Narrative:     Specimen Source->Urine   C. TRACHOMATIS/N. GONORRHOEAE BY AMP DNA          Imaging Results    None          Medications - No data to display  Medical Decision Making  Presents for evaluation of vaginal itching.  Lab work ordered, see HPI  Differentials include but not limited to UTI, pregnancy, STD, BV, yeast  Discharged home, diagnosis vaginal itching.  Prescriptions for home use. She responded well with Oral treatment in the past. Instructed to Rest, increase fluids, lots of water and liquids.  Negative Trichomonas, yeast, clue cells.  Pending gonorrhea and chlamydia panel.  Negative for UTI, negative pregnancy. You have declined prophylactic treatment for this today.  You will be notified if the results are positive and you need treatment. Call [facility] for GYN recheck. Return as needed. She agrees with plan of care.  @ 1344, requesting refill of Albuterol Inhaler    Amount and/or Complexity of Data Reviewed  Labs: ordered. Decision-making details documented in ED Course.    Risk  OTC drugs.  Prescription drug management.               ED Course as of 10/08/24 1345   Tue Oct 08, 2024   1316    Vaginal Screen    Final resultCollected 10/08 12:13    WBC - Vaginal Screen Occasional  Bacteria - Vaginal Screen Rare   Urinalysis, Reflex to Urine Culture Urine, Clean Catch    Final resultCollected 10/08 12:12    Protein, UA 1+  Glucose, UA 2+  Bilirubin (UA) 1+  Blood, UA Trace   Urinalysis Microscopic    Final resultCollected 10/08 12:12    Hyaline Casts, UA 3         [CP]      ED Course User Index  [CP] Lindsey Hannon NP                           Clinical Impression:  Final diagnoses:  [N89.8] Vaginal  itching (Primary)          ED Disposition Condition    Discharge Stable          ED Prescriptions       Medication Sig Dispense Start Date End Date Auth. Provider    fluconazole (DIFLUCAN) 150 MG Tab Take one tablet day 1, take one tablet day 2 2 tablet 10/8/2024 -- Lindsey Hannon NP    albuterol (VENTOLIN HFA) 90 mcg/actuation inhaler Inhale 2 puffs into the lungs every 4 (four) hours as needed for Wheezing or Shortness of Breath (wheezing). Rescue 18 g 10/8/2024 -- Lindsey Hannon NP          Follow-up Information       Follow up With Specialties Details Why Contact Info    Yana Veliz NP Family Medicine Call in 3 days  [address] [place] [address] [address]  [place] [address] [phone]               Lindsey Hannon NP  10/08/24 1346       Lindsey Hannon NP  10/08/24 3269

## 2024-10-08 NOTE — DISCHARGE INSTRUCTIONS
Rest, increase fluids, lots of water and liquids.  Negative Trichomonas, yeast, clue cells.  Pending gonorrhea and chlamydia panel.  Negative for UTI, negative pregnancy. You have declined prophylactic treatment for this today.  You will be notified if the results are positive and you need treatment. Call Replaced by [facility] for GYN recheck. Return as needed

## 2024-10-10 LAB
C TRACH DNA SPEC QL NAA+PROBE: DETECTED
N GONORRHOEA DNA SPEC QL NAA+PROBE: NOT DETECTED

## 2024-10-11 ENCOUNTER — HOSPITAL ENCOUNTER (EMERGENCY)
Facility: HOSPITAL | Age: 29
Discharge: HOME | End: 2024-10-11

## 2024-10-11 VITALS
TEMPERATURE: 99 F | DIASTOLIC BLOOD PRESSURE: 106 MMHG | SYSTOLIC BLOOD PRESSURE: 189 MMHG | HEIGHT: 67 IN | HEART RATE: 114 BPM | OXYGEN SATURATION: 98 % | WEIGHT: 187 LBS | RESPIRATION RATE: 20 BRPM | BODY MASS INDEX: 29.35 KG/M2

## 2024-10-11 DIAGNOSIS — Z20.2 STD EXPOSURE: Primary | ICD-10-CM

## 2024-10-11 PROCEDURE — 99283 EMERGENCY DEPT VISIT LOW MDM: CPT

## 2024-10-11 RX ORDER — DOXYCYCLINE 100 MG/1
100 CAPSULE ORAL 2 TIMES DAILY
Qty: 20 CAPSULE | Refills: 0 | Status: SHIPPED | OUTPATIENT
Start: 2024-10-11 | End: 2024-10-18

## 2024-10-11 NOTE — ED PROVIDER NOTES
Encounter Date: 10/11/2024       History     Chief Complaint   Patient presents with    STD CHECK     Patient states that her my chart says she has chlamydia. She did not get a prescription for chlamydia .      Patient presents with concern for STD.  Patient relates she checked my chart and was positive for chlamydia.  Patient states he still having some mild discomfort with mild dysuria.  Patient relates she is in need for antibiotics.  Denies any new complaints      Review of patient's allergies indicates:  No Known Allergies  Past Medical History:   Diagnosis Date    ADHD (attention deficit hyperactivity disorder)     Hypertension     Thyroid disease      History reviewed. No pertinent surgical history.  No family history on file.  Social History     Tobacco Use    Smoking status: Every Day     Current packs/day: 0.50     Types: Cigarettes    Smokeless tobacco: Never   Substance Use Topics    Alcohol use: Not Currently     Alcohol/week: 7.0 standard drinks of alcohol     Types: 7 Standard drinks or equivalent per week     Comment: daily drinker    Drug use: Not Currently     Types: Marijuana     Review of Systems   All other systems reviewed and are negative.      Physical Exam     Initial Vitals [10/11/24 1543]   BP Pulse Resp Temp SpO2   (!) 189/106 (!) 114 20 99 °F (37.2 °C) 98 %      MAP       --         Physical Exam    Vitals reviewed.  Constitutional: She appears well-developed.   HENT:   Head: Normocephalic.   Eyes: Pupils are equal, round, and reactive to light.   Neck:   Normal range of motion.  Cardiovascular:  Normal rate and regular rhythm.           Pulmonary/Chest: Breath sounds normal.   Abdominal: Abdomen is soft. There is no abdominal tenderness.   Musculoskeletal:         General: Normal range of motion.      Cervical back: Normal range of motion.     Neurological: She is alert and oriented to person, place, and time. GCS score is 15. GCS eye subscore is 4. GCS verbal subscore is 5. GCS motor  subscore is 6.   Skin: Skin is warm and dry. Capillary refill takes less than 2 seconds.   Psychiatric: She has a normal mood and affect.         ED Course   Procedures  Labs Reviewed - No data to display       Imaging Results    None          Medications - No data to display  Medical Decision Making  Patient presents with request for antibiotics secondary to the positive chlamydia test.  Patient denies any new symptoms patient denies pregnancy.  Patient advised to take medications as discussed follow up with primary care return if any worsening or concerns    Risk  Prescription drug management.                                      Clinical Impression:  Final diagnoses:  [Z20.2] STD exposure (Primary)          ED Disposition Condition    Discharge Stable          ED Prescriptions       Medication Sig Dispense Start Date End Date Auth. Provider    doxycycline (VIBRAMYCIN) 100 MG Cap Take 1 capsule (100 mg total) by mouth 2 (two) times daily. for 7 days 20 capsule 10/11/2024 10/18/2024 RehorDago NP          Follow-up Information    None          Dago Merida NP  10/11/24 9369

## 2024-10-14 ENCOUNTER — TELEPHONE (OUTPATIENT)
Dept: EMERGENCY MEDICINE | Facility: HOSPITAL | Age: 29
End: 2024-10-14